# Patient Record
Sex: MALE | Race: WHITE | Employment: FULL TIME | URBAN - METROPOLITAN AREA
[De-identification: names, ages, dates, MRNs, and addresses within clinical notes are randomized per-mention and may not be internally consistent; named-entity substitution may affect disease eponyms.]

---

## 2017-03-28 ENCOUNTER — OFFICE VISIT (OUTPATIENT)
Dept: FAMILY MEDICINE | Facility: CLINIC | Age: 27
End: 2017-03-28
Payer: COMMERCIAL

## 2017-03-28 VITALS
HEART RATE: 92 BPM | BODY MASS INDEX: 20.02 KG/M2 | DIASTOLIC BLOOD PRESSURE: 70 MMHG | SYSTOLIC BLOOD PRESSURE: 110 MMHG | TEMPERATURE: 98.3 F | OXYGEN SATURATION: 97 % | WEIGHT: 113 LBS | HEIGHT: 63 IN

## 2017-03-28 DIAGNOSIS — L30.9 DERMATITIS: ICD-10-CM

## 2017-03-28 DIAGNOSIS — L85.8 KERATOSIS PILARIS: Primary | ICD-10-CM

## 2017-03-28 DIAGNOSIS — L70.0 ACNE VULGARIS: ICD-10-CM

## 2017-03-28 PROCEDURE — 99213 OFFICE O/P EST LOW 20 MIN: CPT | Performed by: FAMILY MEDICINE

## 2017-03-28 RX ORDER — CLINDAMYCIN PHOSPHATE 10 MG/G
GEL TOPICAL 2 TIMES DAILY
Qty: 60 G | Refills: 11 | Status: SHIPPED | OUTPATIENT
Start: 2017-03-28 | End: 2018-05-07

## 2017-03-28 RX ORDER — TRIAMCINOLONE ACETONIDE 1 MG/G
CREAM TOPICAL
Qty: 30 G | Refills: 0 | Status: SHIPPED | OUTPATIENT
Start: 2017-03-28 | End: 2018-05-07

## 2017-03-28 NOTE — NURSING NOTE
"Chief Complaint   Patient presents with     Derm Problem       Initial /70  Pulse 92  Temp 98.3  F (36.8  C) (Oral)  Ht 5' 3\" (1.6 m)  Wt 113 lb (51.3 kg)  SpO2 97%  BMI 20.02 kg/m2 Estimated body mass index is 20.02 kg/(m^2) as calculated from the following:    Height as of this encounter: 5' 3\" (1.6 m).    Weight as of this encounter: 113 lb (51.3 kg).  Medication Reconciliation: complete  "

## 2017-03-28 NOTE — MR AVS SNAPSHOT
After Visit Summary   3/28/2017    Travis Fraga    MRN: [id number]           Patient Information     Date Of Birth          1990        Visit Information        Provider Department      3/28/2017 4:20 PM Howard Bonner MD [facility] [place]        Today's Diagnoses     Keratosis pilaris    -  1    Dermatitis        Acne vulgaris          Care Instructions                   Acne                  What is acne?   Acne is a skin condition that occurs when the oil-secreting glands in the skin are clogged and become inflamed or infected.   How does it occur?   Acne is caused by inflammation or infection of the oil glands in the skin and at the base of hairs. In the teenage years, hormones stimulate the growth of body hair, and the oil glands secrete more oil. The skin pores (where the hairs grow out) become clogged and bacteria grow in the clogged pores. When the body works to kill the bacteria, then whiteheads, blackheads, and pustules form in these areas. Ninety percent of teenagers get acne.   Stress and too little rest can make acne worse.   What are the symptoms?   The symptoms of acne are:   whiteheads, which are closed plugged oil glands   blackheads, which are open plugged oil glands (the oil turns black when it's exposed to the air)   pustules, which are red, inflamed, infected plugged oil glands, sometimes filled with pus.   Some pustules may be painful. In severe cases, cysts or nodules (large fluid-filled bumps) may develop under the skin.   How is it diagnosed?   Your healthcare provider will check your skin to see what type of problem you are having (such as blackheads or cysts). Your provider will look to see where you are having problems, for example, your face or back. Your provider will want to know how long you have had the problem, how you have been caring for your skin, and what treatments you have tried that haven't worked.   How is it treated?   Treatment is aimed at  keeping oil and dirt out of the pores and reducing inflammation.   You and your healthcare provider will talk about how you are currently taking care of your skin. You will discuss which products, such as soaps and lotions, you should or should not use. If you have been using prescription medications for your acne, bring the medicine names or containers to your appointment.   Several products may be used to help prevent pimples or blackheads. Treatment usually begins with putting products containing benzoyl peroxide on the areas of skin with acne.   If benzoyl peroxide alone is not effective, then you may also need to put an antibiotic medicine on your skin, or your healthcare provider may prescribe an antibiotic to be taken by mouth. You may also need to use a skin cream or gel containing tretinoin (Retin-A).   Birth control pills are another treatment that might be prescribed for women. The pills can be used to change the hormone levels and decrease acne. All of the same precautions apply as when women are using the pills for birth control.   An oral medicine called isotretinoin is available for severe acne. However, women must use isotretinoin very carefully. It can cause severe birth defects if a woman becomes pregnant while she is taking the drug or even if she has taken it 1 or 2 months before becoming pregnant.   If you have large cysts, your healthcare provider may inject them with medicine to try to prevent scarring.   How long will the effects last?   New whiteheads usually stop appearing after 4 to 6 weeks of treatment, but you will probably need to continue the treatment for several months. If you are taking an antibiotic, at some point your healthcare provider will ask you to stop taking it to see if it is still needed. Sometimes acne treatment must be continued for several years.   Many factors may worsen acne temporarily. For example, women may notice that their acne gets worse before each menstrual  period. So even with proper treatment, results may vary over time. Try to discover and change, when possible, the factors in your environment or lifestyle that make the acne worse.   How can I take care of myself?   Follow the full treatment prescribed by your healthcare provider. In addition you can:   Wash your face 1 to 2 times a day with a gentle soap. Change your washcloth every day (bacteria can grow on damp cloth). Wash as soon as possible after you exercise.   Wash your hands often and keep your hands away from your face as much as possible. Don't squeeze, pick, scratch, or rub your skin. If you squeeze pimples, you may spread infection and scars may form. Don't rest your face on your hands while you read, study, or watch TV.   Shampoo your hair at least twice a week. Pull your hair away from your face when you sleep. Style it away from your face during the day.   Although researchers have not been able to show that any foods cause acne, some people have found that certain foods seem to worsen their acne. Keep a record of the foods you eat and try to see if any foods appear to make your acne worse. Try avoiding those foods.   Avoid working in hot duran where greasy foods are cooked.   Avoid extreme stress if possible. Practice stress reduction strategies such as exercise, meditation, and counseling if you have a lot of stress.   Get physical exercise regularly.   Keep your follow-up appointments with your healthcare provider. Keep a record of the treatments you have tried and how they have worked. Let your provider know if your medicine isn't working. There are many alternatives for you and your provider to try, so don't give up!         Published by [employer].  This content is reviewed periodically and is subject to change as new health information becomes available. The information is intended to inform and educate and is not a replacement for medical evaluation, advice, diagnosis or treatment by a  "healthcare professional.   Developed by Sindi Soliman RN, [place], and [employer].   ? 2010 [employer] [employer] and/or its affiliates. All Rights Reserved.   Copyright   Clinical Reference Systems 2011              Follow-ups after your visit        Follow-up notes from your care team     Return if symptoms worsen or fail to improve.      Who to contact     If you have questions or need follow up information about today's clinic visit or your schedule please contact [facility] directly at [phone].  Normal or non-critical lab and imaging results will be communicated to you by [employer], letter or phone within 4 business days after the clinic has received the results. If you do not hear from us within 7 days, please contact the clinic through [employer] or phone. If you have a critical or abnormal lab result, we will notify you by phone as soon as possible.  Submit refill requests through [employer] or call your pharmacy and they will forward the refill request to us. Please allow 3 business days for your refill to be completed.          Additional Information About Your Visit        [employer] Information     [employer] gives you secure access to your electronic health record. If you see a primary care provider, you can also send messages to your care team and make appointments. If you have questions, please call your primary care clinic.  If you do not have a primary care provider, please call [phone] and they will assist you.        Care EveryWhere ID     This is your Care EveryWhere ID. This could be used by other organizations to access your [facility] medical records  [id number]        Your Vitals Were     Pulse Temperature Height Pulse Oximetry BMI (Body Mass Index)       92 98.3  F (36.8  C) (Oral) 5' 3\" (1.6 m) 97% 20.02 kg/m2        Blood Pressure from Last 3 Encounters:   03/28/17 110/70   11/22/16 94/58   11/07/16 92/50    Weight from Last 3 Encounters:   03/28/17 113 lb (51.3 kg)   11/22/16 114 lb " (51.7 kg)   11/07/16 110 lb (49.9 kg)              Today, you had the following     No orders found for display         Today's Medication Changes          These changes are accurate as of: 3/28/17  5:05 PM.  If you have any questions, ask your nurse or doctor.               Start taking these medicines.        Dose/Directions    clindamycin 1 % topical gel   Commonly known as:  CLINDAMAX   Used for:  Acne vulgaris   Started by:  Howard Bonner MD        Apply topically 2 times daily   Quantity:  60 g   Refills:  11       triamcinolone 0.1 % cream   Commonly known as:  KENALOG   Used for:  Dermatitis   Started by:  Howard Bonner MD        Apply sparingly to affected area three times daily for 14 days.   Quantity:  30 g   Refills:  0            Where to get your medicines      These medications were sent to [facility] [place] [address]     Phone:  [phone]     clindamycin 1 % topical gel    triamcinolone 0.1 % cream                Primary Care Provider Office Phone # Fax #    Howard Bonner [phone]       [address] [address]        Thank you!     Thank you for choosing [facility]  for your care. Our goal is always to provide you with excellent care. Hearing back from our patients is one way we can continue to improve our services. Please take a few minutes to complete the written survey that you may receive in the mail after your visit with us. Thank you!             Your Updated Medication List - Protect others around you: Learn how to safely use, store and throw away your medicines at www.disposemymeds.org.          This list is accurate as of: 3/28/17  5:05 PM.  Always use your most recent med list.                   Brand Name Dispense Instructions for use    acetaminophen 325 MG tablet    TYLENOL    100 tablet    Take 2  tablets (650 mg) by mouth every 6 hours as needed for mild pain       albuterol 108 (90 BASE) MCG/ACT Inhaler    PROAIR HFA/PROVENTIL HFA/VENTOLIN HFA    1 Inhaler    Inhale 2 puffs into the lungs every 6 hours as needed for shortness of breath / dyspnea or wheezing       clindamycin 1 % topical gel    CLINDAMAX    60 g    Apply topically 2 times daily       triamcinolone 0.1 % cream    KENALOG    30 g    Apply sparingly to affected area three times daily for 14 days.

## 2017-03-28 NOTE — PROGRESS NOTES
"  SUBJECTIVE:                                                    Travis Fraga is a 26 year old male who presents to clinic today for the following health issues:    \"Ingrown hair\"  Patient developed small red firm papuels on the anterior thigh hat he is concerned are ingrown hairs bilaterally since October 2016. He has been using selsun blue but not resolving. It becomes itchy at times, but is not painful.     Acne  He mentions he has acne breakouts on lower right & left side of his face and on his back.     Tobacco abuse  He smokes 0.5 ppd. He experiences a cough after exertion.     Problem list and histories reviewed & adjusted, as indicated.  Additional history: as documented    ROS:  Constitutional, HEENT, cardiovascular, pulmonary, GI, , musculoskeletal, neuro, skin, endocrine and psych systems are negative, except as otherwise noted.    This document serves as a record of the services and decisions personally performed and made by Howard Bonner MD. It was created on his behalf by Krystyna Cintron, a trained medical scribe. The creation of this document is based on the provider's statements to the medical scribe.  Krystyna Cintron 5:11 PM March 28, 2017    OBJECTIVE:                                                    /70  Pulse 92  Temp 98.3  F (36.8  C) (Oral)  Ht 1.6 m (5' 3\")  Wt 51.3 kg (113 lb)  SpO2 97%  BMI 20.02 kg/m2 Body mass index is 20.02 kg/(m^2).   GENERAL: healthy, alert, well nourished, well hydrated, no distress  SKIN: few small pink papules on the thighs and a few comedones on the lower faceno suspicious lesions, no rashes  Diagnostic test results:  none      ASSESSMENT/PLAN:         Travis was seen today for derm problem.    Diagnoses and all orders for this visit:    Keratosis pilaris/Dermatitis  Steroid cream and lotion for itchiness.   -     triamcinolone (KENALOG) 0.1 % cream; Apply sparingly to affected area three times daily for 14 days.    Acne vulgaris  Patient can " use OTC benzoyl peroxide for acne if needed in the future.  -     clindamycin (CLINDAMAX) 1 % topical gel; Apply topically 2 times daily    Risks, benefits and alternatives of treatments discussed. Plan agreed on.      Followup: As needed    Will call, return to clinic, or go to ED if worsening or symptoms not improving as discussed.    See patient instructions.       Health Maintenance Topics with due status: Overdue       Topic Date Due    HPV IMMUNIZATION 06/19/2003       Health maintenance reviewed/updated? Yes    The information in this document, created by the medical scribe for me, accurately reflects the services I personally performed and the decisions made by me. I have reviewed and approved this document for accuracy prior to leaving the patient care area.  March 28, 2017 5:11 PM        Isrrael Bonner MD

## 2017-03-28 NOTE — PATIENT INSTRUCTIONS
Acne                  What is acne?   Acne is a skin condition that occurs when the oil-secreting glands in the skin are clogged and become inflamed or infected.   How does it occur?   Acne is caused by inflammation or infection of the oil glands in the skin and at the base of hairs. In the teenage years, hormones stimulate the growth of body hair, and the oil glands secrete more oil. The skin pores (where the hairs grow out) become clogged and bacteria grow in the clogged pores. When the body works to kill the bacteria, then whiteheads, blackheads, and pustules form in these areas. Ninety percent of teenagers get acne.   Stress and too little rest can make acne worse.   What are the symptoms?   The symptoms of acne are:   whiteheads, which are closed plugged oil glands   blackheads, which are open plugged oil glands (the oil turns black when it's exposed to the air)   pustules, which are red, inflamed, infected plugged oil glands, sometimes filled with pus.   Some pustules may be painful. In severe cases, cysts or nodules (large fluid-filled bumps) may develop under the skin.   How is it diagnosed?   Your healthcare provider will check your skin to see what type of problem you are having (such as blackheads or cysts). Your provider will look to see where you are having problems, for example, your face or back. Your provider will want to know how long you have had the problem, how you have been caring for your skin, and what treatments you have tried that haven't worked.   How is it treated?   Treatment is aimed at keeping oil and dirt out of the pores and reducing inflammation.   You and your healthcare provider will talk about how you are currently taking care of your skin. You will discuss which products, such as soaps and lotions, you should or should not use. If you have been using prescription medications for your acne, bring the medicine names or containers to your appointment.   Several  products may be used to help prevent pimples or blackheads. Treatment usually begins with putting products containing benzoyl peroxide on the areas of skin with acne.   If benzoyl peroxide alone is not effective, then you may also need to put an antibiotic medicine on your skin, or your healthcare provider may prescribe an antibiotic to be taken by mouth. You may also need to use a skin cream or gel containing tretinoin (Retin-A).   Birth control pills are another treatment that might be prescribed for women. The pills can be used to change the hormone levels and decrease acne. All of the same precautions apply as when women are using the pills for birth control.   An oral medicine called isotretinoin is available for severe acne. However, women must use isotretinoin very carefully. It can cause severe birth defects if a woman becomes pregnant while she is taking the drug or even if she has taken it 1 or 2 months before becoming pregnant.   If you have large cysts, your healthcare provider may inject them with medicine to try to prevent scarring.   How long will the effects last?   New whiteheads usually stop appearing after 4 to 6 weeks of treatment, but you will probably need to continue the treatment for several months. If you are taking an antibiotic, at some point your healthcare provider will ask you to stop taking it to see if it is still needed. Sometimes acne treatment must be continued for several years.   Many factors may worsen acne temporarily. For example, women may notice that their acne gets worse before each menstrual period. So even with proper treatment, results may vary over time. Try to discover and change, when possible, the factors in your environment or lifestyle that make the acne worse.   How can I take care of myself?   Follow the full treatment prescribed by your healthcare provider. In addition you can:   Wash your face 1 to 2 times a day with a gentle soap. Change your washcloth every  day (bacteria can grow on damp cloth). Wash as soon as possible after you exercise.   Wash your hands often and keep your hands away from your face as much as possible. Don't squeeze, pick, scratch, or rub your skin. If you squeeze pimples, you may spread infection and scars may form. Don't rest your face on your hands while you read, study, or watch TV.   Shampoo your hair at least twice a week. Pull your hair away from your face when you sleep. Style it away from your face during the day.   Although researchers have not been able to show that any foods cause acne, some people have found that certain foods seem to worsen their acne. Keep a record of the foods you eat and try to see if any foods appear to make your acne worse. Try avoiding those foods.   Avoid working in hot duran where greasy foods are cooked.   Avoid extreme stress if possible. Practice stress reduction strategies such as exercise, meditation, and counseling if you have a lot of stress.   Get physical exercise regularly.   Keep your follow-up appointments with your healthcare provider. Keep a record of the treatments you have tried and how they have worked. Let your provider know if your medicine isn't working. There are many alternatives for you and your provider to try, so don't give up!         Published by [employer].  This content is reviewed periodically and is subject to change as new health information becomes available. The information is intended to inform and educate and is not a replacement for medical evaluation, advice, diagnosis or treatment by a healthcare professional.   Developed by Sindi Soliman RN, [place], and [employer].   ? 2010 [facility] and/or its affiliates. All Rights Reserved.   Copyright   Clinical Reference Systems 2011

## 2018-05-07 ENCOUNTER — OFFICE VISIT (OUTPATIENT)
Dept: FAMILY MEDICINE | Facility: CLINIC | Age: 28
End: 2018-05-07
Payer: COMMERCIAL

## 2018-05-07 VITALS
WEIGHT: 123 LBS | DIASTOLIC BLOOD PRESSURE: 76 MMHG | HEIGHT: 63 IN | HEART RATE: 87 BPM | BODY MASS INDEX: 21.79 KG/M2 | TEMPERATURE: 98.3 F | SYSTOLIC BLOOD PRESSURE: 124 MMHG | OXYGEN SATURATION: 97 %

## 2018-05-07 DIAGNOSIS — J45.20 MILD INTERMITTENT ASTHMA WITHOUT COMPLICATION: Primary | ICD-10-CM

## 2018-05-07 DIAGNOSIS — F17.200 TOBACCO USE DISORDER: ICD-10-CM

## 2018-05-07 DIAGNOSIS — J06.9 UPPER RESPIRATORY TRACT INFECTION, UNSPECIFIED TYPE: ICD-10-CM

## 2018-05-07 PROCEDURE — 99214 OFFICE O/P EST MOD 30 MIN: CPT | Performed by: PHYSICIAN ASSISTANT

## 2018-05-07 RX ORDER — ALBUTEROL SULFATE 90 UG/1
2 AEROSOL, METERED RESPIRATORY (INHALATION) EVERY 4 HOURS PRN
Qty: 1 INHALER | Refills: 11 | Status: SHIPPED | OUTPATIENT
Start: 2018-05-07 | End: 2019-03-29

## 2018-05-07 RX ORDER — AZITHROMYCIN 250 MG/1
TABLET, FILM COATED ORAL
Qty: 6 TABLET | Refills: 0 | Status: SHIPPED | OUTPATIENT
Start: 2018-05-07 | End: 2018-11-12

## 2018-05-07 NOTE — MR AVS SNAPSHOT
After Visit Summary   5/7/2018    Travis Fraga    MRN: [id number]           Patient Information     Date Of Birth          1990        Visit Information        Provider Department      5/7/2018 11:40 AM Angelina Guillen PA-C [facility]        Today's Diagnoses     Mild intermittent asthma without complication - with illnesses    -  1    Tobacco use disorder        Upper respiratory tract infection, unspecified type          Care Instructions    Please take the albuterol every 4-6 hours as needed for shortness of breath, wheezing or cough.   Also, the Azithromycin as prescribed for the respiratory tract infection.     Please followup in the next month for your routine physical, or be seen sooner if needed.   Please seek immediate medical attention if symptoms change or worsen in any way.            Follow-ups after your visit        Follow-up notes from your care team     Return in about 1 month (around 6/7/2018) for Physical Exam, Routine Visit, please be seen sooner if needed.      Who to contact     If you have questions or need follow up information about today's clinic visit or your schedule please contact [facility] directly at [phone].  Normal or non-critical lab and imaging results will be communicated to you by [employer], letter or phone within 4 business days after the clinic has received the results. If you do not hear from us within 7 days, please contact the clinic through [employer] or phone. If you have a critical or abnormal lab result, we will notify you by phone as soon as possible.  Submit refill requests through Access Information Management or call your pharmacy and they will forward the refill request to us. Please allow 3 business days for your refill to be completed.          Additional Information About Your Visit        [employer] Information     Access Information Management gives you secure access to your electronic health record. If you see a primary care provider, you can also  "send messages to your care team and make appointments. If you have questions, please call your primary care clinic.  If you do not have a primary care provider, please call [phone] and they will assist you.        Care EveryWhere ID     This is your Care EveryWhere ID. This could be used by other organizations to access your [place] medical records  [id number]        Your Vitals Were     Pulse Temperature Height Pulse Oximetry BMI (Body Mass Index)       87 98.3  F (36.8  C) (Oral) 5' 3\" (1.6 m) 97% 21.79 kg/m2        Blood Pressure from Last 3 Encounters:   05/07/18 124/76   03/28/17 110/70   11/22/16 94/58    Weight from Last 3 Encounters:   05/07/18 123 lb (55.8 kg)   03/28/17 113 lb (51.3 kg)   11/22/16 114 lb (51.7 kg)              Today, you had the following     No orders found for display         Today's Medication Changes          These changes are accurate as of 5/7/18 12:13 PM.  If you have any questions, ask your nurse or doctor.               Start taking these medicines.        Dose/Directions    azithromycin 250 MG tablet   Commonly known as:  ZITHROMAX   Used for:  Upper respiratory tract infection, unspecified type   Started by:  Angelina Guillen PA-C        Two tablets first day, then one tablet daily for four days.   Quantity:  6 tablet   Refills:  0         These medicines have changed or have updated prescriptions.        Dose/Directions    albuterol 108 (90 Base) MCG/ACT Inhaler   Commonly known as:  PROAIR HFA/PROVENTIL HFA/VENTOLIN HFA   This may have changed:  when to take this   Used for:  Mild intermittent asthma without complication   Changed by:  Angelina Guillen PA-C        Dose:  2 puff   Inhale 2 puffs into the lungs every 4 hours as needed for shortness of breath / dyspnea or wheezing   Quantity:  1 Inhaler   Refills:  11            Where to get your medicines      These medications were sent to [email or web address] [address] [place] [address] AT [facility] OF " [facility] [address] [address]    Hours:  24-hours Phone:  [phone]     albuterol 108 (90 Base) MCG/ACT Inhaler    azithromycin 250 MG tablet                Primary Care Provider Office Phone # Fax #    Howard Bonner [phone]       [address] [place] [address]        Equal Access to Services     LUIS ENRIQUE HATFIELD : Hadii aad ku hadasho Soomaali, waaxda luqadaha, qaybta kaalmada adeegyada, waxay idiin hayaan adeeg constantincrystalvanessa lasophie . So [facility] [phone].    ATENCIÓN: Si habla español, tiene a lopez disposición servicios gratuitos de asistencia lingüística. Garryame al [phone].    We comply with applicable federal civil rights laws and [place] laws. We do not discriminate on the basis of race, color, national origin, age, disability, sex, sexual orientation, or gender identity.            Thank you!     Thank you for choosing [facility]  for your care. Our goal is always to provide you with excellent care. Hearing back from our patients is one way we can continue to improve our services. Please take a few minutes to complete the written survey that you may receive in the mail after your visit with us. Thank you!             Your Updated Medication List - Protect others around you: Learn how to safely use, store and throw away your medicines at www.disposemymeds.org.          This list is accurate as of 5/7/18 12:13 PM.  Always use your most recent med list.                   Brand Name Dispense Instructions for use Diagnosis    albuterol 108 (90 Base) MCG/ACT Inhaler    PROAIR HFA/PROVENTIL HFA/VENTOLIN HFA    1 Inhaler    Inhale 2 puffs into the lungs every 4 hours as needed for shortness of breath / dyspnea or wheezing    Mild intermittent asthma without complication       azithromycin 250 MG tablet    ZITHROMAX    6 tablet    Two tablets first day, then one tablet daily for four days.    Upper respiratory tract infection,  unspecified type

## 2018-05-07 NOTE — PROGRESS NOTES
SUBJECTIVE:                                                    Travis Fraga is a 27 year old male who presents to clinic today for the following health issues:      Acute Illness   Acute illness concerns: Sinus problem, cough  Onset: x1 weeks    Fever: YES- felt warm    Chills/Sweats: YES- both    Headache (location?): YES- forehead    Sinus Pressure:YES    Conjunctivitis:  YES- right blood shot after coughing hard    Ear Pain: no    Rhinorrhea: YES yellow green    Congestion: YES- chest sinus    Sore Throat: YES- from coughing - all the time     Cough: YES-productive of yellow sputum, productive of green sputum    Wheeze: YES- last couple days    Decreased Appetite: YES- sporadic     Nausea: no    Vomiting: YES- this morning from coughing    Diarrhea:  no    Dysuria/Freq.: no    Fatigue/Achiness: YES- both - on and off last 7 days    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    He is coughing up production, that is clear-yellow/green in color.  He does have sinus pain under his eyes and around his nose.  He is blowing yellow/green congestion out of his nose as well.  As the day goes on the congestion gets more clear.  Patient does not have a thermometer, but he has had temperature changes of feeling really hot and then very very cold.      He has not used the albuterol at all with this illness as he does not have it.  He does feel a little wheezing with the illness.      Problem list and histories reviewed & adjusted, as indicated.  Additional history: as documented      ROS:  Constitutional, HEENT, cardiovascular, pulmonary, GI, , musculoskeletal, neuro, skin, endocrine and psych systems are negative, except as otherwise noted.    OBJECTIVE:                                                    /76 (BP Location:  "Right arm, Patient Position: Chair, Cuff Size: Adult Regular)  Pulse 87  Temp 98.3  F (36.8  C) (Oral)  Ht 5' 3\" (1.6 m)  Wt 123 lb (55.8 kg)  SpO2 97%  BMI 21.79 kg/m2  Body mass index is 21.79 kg/(m^2).  GENERAL: healthy, alert and no distress  EYES: Eyes grossly normal to inspection, PERRL and conjunctivae and sclerae normal  HENT: ear canals and TM's normal, nose and mouth without ulcers or lesions  NECK: no adenopathy, no asymmetry, masses, or scars and thyroid normal to palpation  RESP: lungs clear to auscultation - no rales, rhonchi or wheezes, mildly prolonged expiratory phase.   CV: regular rate and rhythm, normal S1 S2, no S3 or S4, no murmur, click or rub, no peripheral edema and peripheral pulses strong  MS: no gross musculoskeletal defects noted, no edema  NEURO: Normal strength and tone, mentation intact and speech normal  PSYCH: mentation appears normal, affect normal/bright    Diagnostic Test Results:  none      ASSESSMENT/PLAN:                                                      Travis was seen today for cough and sinus problem.    Diagnoses and all orders for this visit:    Mild intermittent asthma without complication - with illnesses  -     albuterol (PROAIR HFA/PROVENTIL HFA/VENTOLIN HFA) 108 (90 Base) MCG/ACT Inhaler; Inhale 2 puffs into the lungs every 4 hours as needed for shortness of breath / dyspnea or wheezing    Upper respiratory tract infection, unspecified type  -     azithromycin (ZITHROMAX) 250 MG tablet; Two tablets first day, then one tablet daily for four days.    Tobacco use disorder      - Patient treated for URI symptoms due to length of illness and worsening of symptoms.  Patient has a smoking history and a history of asthma.  Smoking cessation strongly encouraged today.   - Refill of the albuterol given today as patient has been without this.  He has been encouraged to use the inhaler every 4-6 hours as needed for cough, shortness of breath or wheezing.    - Patient to " seek more immediate medical attention if symptoms change or worsen in any way.      - Note provided to patient today as he is out of work today for the illness.      -- I have discussed the patient's diagnosis, and my plan of treatment with the patient and/or family. Patient is aware to followup if symptoms do not improve.  Patient has been advised to be seen sooner or seek more immediate care if symptoms change or worsen.  Patient agrees with and understands the plan today.     See Patient Instructions:  Please take the albuterol every 4-6 hours as needed for shortness of breath, wheezing or cough.   Also, the Azithromycin as prescribed for the respiratory tract infection.     Please followup in the next month for your routine physical, or be seen sooner if needed.   Please seek immediate medical attention if symptoms change or worsen in any way.          Angelina Guillen PA-C    [facility] [place]

## 2018-05-07 NOTE — LETTER
[facility] - [address] [address]                                                                                                       [phone]    May 7, 2018    Travis [address] [place]  [address] [facility] [address]  [facility] [address]      To Whom it May Concern:    The above patient is unable to attend work for 05.07.2018 due to a medical issue. Please contact me with questions or concerns.      Sincerely,      Angelina Guillen PA-C

## 2018-05-07 NOTE — PATIENT INSTRUCTIONS
Please take the albuterol every 4-6 hours as needed for shortness of breath, wheezing or cough.   Also, the Azithromycin as prescribed for the respiratory tract infection.     Please followup in the next month for your routine physical, or be seen sooner if needed.   Please seek immediate medical attention if symptoms change or worsen in any way.

## 2018-11-12 ENCOUNTER — OFFICE VISIT (OUTPATIENT)
Dept: FAMILY MEDICINE | Facility: CLINIC | Age: 28
End: 2018-11-12

## 2018-11-12 VITALS
OXYGEN SATURATION: 100 % | BODY MASS INDEX: 21.09 KG/M2 | WEIGHT: 119 LBS | DIASTOLIC BLOOD PRESSURE: 64 MMHG | TEMPERATURE: 98 F | HEIGHT: 63 IN | HEART RATE: 92 BPM | SYSTOLIC BLOOD PRESSURE: 118 MMHG

## 2018-11-12 DIAGNOSIS — H01.002 BLEPHARITIS OF RIGHT LOWER EYELID, UNSPECIFIED TYPE: Primary | ICD-10-CM

## 2018-11-12 PROCEDURE — 99213 OFFICE O/P EST LOW 20 MIN: CPT | Performed by: PHYSICIAN ASSISTANT

## 2018-11-12 NOTE — PROGRESS NOTES
"  SUBJECTIVE:                                                    Travis Fraga is a 28 year old male who presents to clinic today for the following health issues:      Eye(s) Problem / Feels he has Styes right eye    Onset: July 2018    Description:   Location: bilateral  Pain: YES  Redness: YES    Accompanying Signs & Symptoms:  Discharge/mattering: no  Swelling: no  Visual changes: no  Fever: no  Nasal Congestion: no  Bothered by bright lights: no    History:   Trauma: YES- Hot  Crack in eyes in July  Foreign body exposure: YES- See Above    Precipitating factors:   Wearing contacts: no    Alleviating factors:  Improved by:       Therapies Tried and outcome: none    Patient reports that 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  He says that a friend told him that he would know if he got any of it in his eyes.  He says that he monitored symptoms over the next couple of week and never went in because he didn't have any concerns.  He says that about one month later he started to feel itchiness and dryness in the right eye.  He noticed a pimple on his right eye just last week.  He also reports to having three small bumps on the lower eyelid.  He does not notice any discharge or mattering of the eyes, but the eyes do water.  He says that the itchiness comes and goes throughout the day.  He does feel like there is something in the eye.  He denies any visual changes and has not had any light sensitivity.        Eyesight today is:  20/25 (Left) and 20/20 (right)    Problem list and histories reviewed & adjusted, as indicated.  Additional history: as documented      ROS:  Constitutional, HEENT, cardiovascular, pulmonary, GI, , musculoskeletal, neuro, skin, endocrine and psych systems are negative, except as " "otherwise noted.    OBJECTIVE:                                                    /64  Pulse 92  Temp 98  F (36.7  C) (Tympanic)  Ht 5' 3\" (1.6 m)  Wt 119 lb (54 kg)  SpO2 100%  BMI 21.08 kg/m2  Body mass index is 21.08 kg/(m^2).  GENERAL: healthy, alert and no distress  EYES: Eyes grossly normal to inspection, PERRL and conjunctivae and sclerae normal, right bottom lid with three small skin colored papules.    MS: no gross musculoskeletal defects noted, no edema  SKIN: no suspicious lesions or rashes  NEURO: Normal strength and tone, mentation intact and speech normal  PSYCH: mentation appears normal, affect normal/bright    Diagnostic Test Results:  none      ASSESSMENT/PLAN:                                                      Travis was seen today for eye problem.    Diagnoses and all orders for this visit:    Blepharitis of right lower eyelid, unspecified type  -     OPHTHALMOLOGY ADULT REFERRAL      No stye or chalazion appearing lesions on the eyes.  Patient reports that the \"pimple\" is gone.  There are small, skin colored papules on the bottom lid of the right eye.  Patient has been advised of cares for blepharitis, and has been advised to followup with ophthalmology if symptoms persist or worsen in any way.  Visual acuity is within normal limits today.      Followup: Return in about 1 week (around 11/19/2018) for Specialty followup, please be seen sooner if needed.    -- I have discussed the patient's diagnosis, and my plan of treatment with the patient and/or family. Patient is aware to followup if symptoms do not improve.  Patient has been advised to be seen sooner or seek more immediate care if symptoms change or worsen.  Patient agrees with and understands the plan today.     See Patient Instructions        Angelina Guillen PA-C    [facility] [place]    "

## 2018-11-12 NOTE — MR AVS SNAPSHOT
After Visit Summary   11/12/2018    Travis Fraga    MRN: [id number]           Patient Information     Date Of Birth          1990        Visit Information        Provider Department      11/12/2018 4:20 PM Angelina Guillen PA-C [facility] [place]        Today's Diagnoses     Blepharitis of right lower eyelid, unspecified type    -  1      Care Instructions      Treating Blepharitis: Self-Care    To treat the problem, keep your eyelids clean. Warm compresses can reduce redness and swelling, and help clean your eyelids, too. You may also need to wash the area gently with an eyelid scrub when you wake up.  To apply a warm compress:  1. Wash your hands with soap and warm water.  2. Wet a clean washcloth with warm water. Then wring it out.  3. Close your eyes and place the washcloth over your eyelids for 3 to 5 minutes. This helps loosen scales or crusts.  4. Wet the washcloth again as often as needed to keep it warm.  Repeat 2 or more times a day. Use a clean washcloth each time.     To use an eyelid scrub:  1. Wash your hands with soap and warm water.  2. Use a ready-made eyelid scrub. Or mix 3 drops of baby shampoo in 1/4 cup of warm water.  3. Dip a lint-free pad, cotton swab, or clean washcloth in the scrub.  4. Close one eye and gently scrub the base of the eyelid.  5. Rinse the lid in cool water and dry with a clean towel.  6. Repeat on your other eye.  Date Last Reviewed: 3/1/2018    2401-6898 [employer]. [address] [place] [address]. All rights reserved. This information is not intended as a substitute for professional medical care. Always follow your healthcare professional's instructions.                Follow-ups after your visit        Additional Services     OPHTHALMOLOGY ADULT REFERRAL       Your provider has referred you to:   [facility] -   [place] [phone]   [email or web address]  [facility] [phone]    [email or web address]  [place] [phone]   [email or web address]    Please be aware that coverage of these services is subject to the terms and limitations of your health insurance plan.  Call member services at your health plan with any benefit or coverage questions.      Please bring the following with you to your appointment:    (1) Any X-Rays, CTs or MRIs which have been performed.  Contact the facility where they were done to arrange for  prior to your scheduled appointment.  Any new CT, MRI or other procedures ordered by your specialist must be performed at a [place] facility or coordinated by your clinic's referral office.  (2) List of current medications  (3) This referral request   (4) Any documents/labs given to you for this referral                  Follow-up notes from your care team     Return in about 1 week (around 11/19/2018) for Specialty followup, please be seen sooner if needed.      Who to contact     If you have questions or need follow up information about today's clinic visit or your schedule please contact [facility] [place] [place] directly at [phone].  Normal or non-critical lab and imaging results will be communicated to you by MyChart, letter or phone within 4 business days after the clinic has received the results. If you do not hear from us within 7 days, please contact the clinic through [employer] or phone. If you have a critical or abnormal lab result, we will notify you by phone as soon as possible.  Submit refill requests through eyefactive or call your pharmacy and they will forward the refill request to us. Please allow 3 business days for your refill to be completed.          Additional Information About Your Visit        eyefactive Information     eyefactive gives you secure access to your electronic health record. If you see a primary care provider, you can also send messages to your care team and make appointments. If you have questions, please call your primary  "care clinic.  If you do not have a primary care provider, please call [phone] and they will assist you.        Care EveryWhere ID     This is your Care EveryWhere ID. This could be used by other organizations to access your [place] medical records  [id number]        Your Vitals Were     Pulse Temperature Height Pulse Oximetry BMI (Body Mass Index)       92 98  F (36.7  C) (Tympanic) 5' 3\" (1.6 m) 100% 21.08 kg/m2        Blood Pressure from Last 3 Encounters:   11/12/18 118/64   05/07/18 124/76   03/28/17 110/70    Weight from Last 3 Encounters:   11/12/18 119 lb (54 kg)   05/07/18 123 lb (55.8 kg)   03/28/17 113 lb (51.3 kg)              We Performed the Following     OPHTHALMOLOGY ADULT REFERRAL        Primary Care Provider Office Phone # Fax #    Howard Bonner -[phone]       [address] [place] [address]        Equal Access to Services     [facility] : Hadii jeb ku hadasho Soomaali, waaxda luqadaha, qaybta kaalmada adeegyada, mayela yanez . So [facility] [phone].    ATENCIÓN: Si habla español, tiene a lopez disposición servicios gratuitos de asistencia lingüística. [facility] [phone].    We comply with applicable federal civil rights laws and [place] laws. We do not discriminate on the basis of race, color, national origin, age, disability, sex, sexual orientation, or gender identity.            Thank you!     Thank you for choosing [facility]  for your care. Our goal is always to provide you with excellent care. Hearing back from our patients is one way we can continue to improve our services. Please take a few minutes to complete the written survey that you may receive in the mail after your visit with us. Thank you!             Your Updated Medication List - Protect others around you: Learn how to safely use, store and throw away your medicines at www.disposemymeds.org.          This list is accurate as of 11/12/18  5:02 " PM.  Always use your most recent med list.                   Brand Name Dispense Instructions for use Diagnosis    albuterol 108 (90 Base) MCG/ACT inhaler    PROAIR HFA/PROVENTIL HFA/VENTOLIN HFA    1 Inhaler    Inhale 2 puffs into the lungs every 4 hours as needed for shortness of breath / dyspnea or wheezing    Mild intermittent asthma without complication

## 2018-11-12 NOTE — PATIENT INSTRUCTIONS
Treating Blepharitis: Self-Care    To treat the problem, keep your eyelids clean. Warm compresses can reduce redness and swelling, and help clean your eyelids, too. You may also need to wash the area gently with an eyelid scrub when you wake up.  To apply a warm compress:  1. Wash your hands with soap and warm water.  2. Wet a clean washcloth with warm water. Then wring it out.  3. Close your eyes and place the washcloth over your eyelids for 3 to 5 minutes. This helps loosen scales or crusts.  4. Wet the washcloth again as often as needed to keep it warm.  Repeat 2 or more times a day. Use a clean washcloth each time.     To use an eyelid scrub:  1. Wash your hands with soap and warm water.  2. Use a ready-made eyelid scrub. Or mix 3 drops of baby shampoo in 1/4 cup of warm water.  3. Dip a lint-free pad, cotton swab, or clean washcloth in the scrub.  4. Close one eye and gently scrub the base of the eyelid.  5. Rinse the lid in cool water and dry with a clean towel.  6. Repeat on your other eye.  Date Last Reviewed: 3/1/2018    8462-4755 [employer]. [address] [place] [address], [place] [address]. All rights reserved. This information is not intended as a substitute for professional medical care. Always follow your healthcare professional's instructions.

## 2018-11-13 ASSESSMENT — ASTHMA QUESTIONNAIRES: ACT_TOTALSCORE: 25

## 2018-12-04 ENCOUNTER — OFFICE VISIT (OUTPATIENT)
Dept: FAMILY MEDICINE | Facility: CLINIC | Age: 28
End: 2018-12-04

## 2018-12-04 VITALS
BODY MASS INDEX: 20.38 KG/M2 | TEMPERATURE: 98.6 F | DIASTOLIC BLOOD PRESSURE: 62 MMHG | OXYGEN SATURATION: 98 % | SYSTOLIC BLOOD PRESSURE: 136 MMHG | HEIGHT: 63 IN | WEIGHT: 115 LBS | HEART RATE: 96 BPM

## 2018-12-04 DIAGNOSIS — R19.7 DIARRHEA, UNSPECIFIED TYPE: Primary | ICD-10-CM

## 2018-12-04 DIAGNOSIS — R10.9 FLANK PAIN: ICD-10-CM

## 2018-12-04 LAB
ALBUMIN UR-MCNC: NEGATIVE MG/DL
APPEARANCE UR: CLEAR
BILIRUB UR QL STRIP: NEGATIVE
COLOR UR AUTO: YELLOW
ERYTHROCYTE [DISTWIDTH] IN BLOOD BY AUTOMATED COUNT: 12.6 % (ref 10–15)
GLUCOSE UR STRIP-MCNC: NEGATIVE MG/DL
HCT VFR BLD AUTO: 46.6 % (ref 40–53)
HGB BLD-MCNC: 16.3 G/DL (ref 13.3–17.7)
HGB UR QL STRIP: NEGATIVE
KETONES UR STRIP-MCNC: NEGATIVE MG/DL
LEUKOCYTE ESTERASE UR QL STRIP: NEGATIVE
MCH RBC QN AUTO: 30.8 PG (ref 26.5–33)
MCHC RBC AUTO-ENTMCNC: 35 G/DL (ref 31.5–36.5)
MCV RBC AUTO: 88 FL (ref 78–100)
NITRATE UR QL: NEGATIVE
PH UR STRIP: 7 PH (ref 5–7)
PLATELET # BLD AUTO: 252 10E9/L (ref 150–450)
RBC # BLD AUTO: 5.3 10E12/L (ref 4.4–5.9)
SOURCE: NORMAL
SP GR UR STRIP: 1.01 (ref 1–1.03)
UROBILINOGEN UR STRIP-ACNC: 0.2 EU/DL (ref 0.2–1)
WBC # BLD AUTO: 7.1 10E9/L (ref 4–11)

## 2018-12-04 PROCEDURE — 36415 COLL VENOUS BLD VENIPUNCTURE: CPT | Performed by: NURSE PRACTITIONER

## 2018-12-04 PROCEDURE — 80053 COMPREHEN METABOLIC PANEL: CPT | Performed by: NURSE PRACTITIONER

## 2018-12-04 PROCEDURE — 81003 URINALYSIS AUTO W/O SCOPE: CPT | Performed by: NURSE PRACTITIONER

## 2018-12-04 PROCEDURE — 99213 OFFICE O/P EST LOW 20 MIN: CPT | Performed by: NURSE PRACTITIONER

## 2018-12-04 PROCEDURE — 85027 COMPLETE CBC AUTOMATED: CPT | Performed by: NURSE PRACTITIONER

## 2018-12-04 NOTE — MR AVS SNAPSHOT
After Visit Summary   12/4/2018    Travis Fraga    MRN: [id number]           Patient Information     Date Of Birth          1990        Visit Information        Provider Department      12/4/2018 3:00 PM Sarah Byrnes APRN CNP [facility] [place]        Today's Diagnoses     Diarrhea, unspecified type    -  1    Flank pain          Care Instructions    Travis was seen today for diarrhea.    Diagnoses and all orders for this visit:    Diarrhea, unspecified type  -     Enteric Bacteria and Virus Panel by VIKI Stool; Future  -     Clostridium difficile Toxin B PCR; Future  -     Ova and Parasite Exam Routine; Future  -     CBC with platelets  -     Comprehensive metabolic panel    Flank pain  -     UA reflex to Microscopic and Culture    Will notify of results once available.             Follow-ups after your visit        Follow-up notes from your care team     Return in about 1 week (around 12/11/2018) for No improvement or worsening of symptoms.      Future tests that were ordered for you today     Open Future Orders        Priority Expected Expires Ordered    Enteric Bacteria and Virus Panel by VIKI Stool Routine  12/4/2019 12/4/2018    Clostridium difficile Toxin B PCR Routine  1/3/2019 12/4/2018    Ova and Parasite Exam Routine Routine  12/4/2019 12/4/2018            Who to contact     If you have questions or need follow up information about today's clinic visit or your schedule please contact [facility] [place] directly at [phone].  Normal or non-critical lab and imaging results will be communicated to you by MyChart, letter or phone within 4 business days after the clinic has received the results. If you do not hear from us within 7 days, please contact the clinic through MyChart or phone. If you have a critical or abnormal lab result, we will notify you by phone as soon as possible.  Submit refill requests through combionic or call your pharmacy and they will forward the  "refill request to us. Please allow 3 business days for your refill to be completed.          Additional Information About Your Visit        1Mindhart Information     [employer] gives you secure access to your electronic health record. If you see a primary care provider, you can also send messages to your care team and make appointments. If you have questions, please call your primary care clinic.  If you do not have a primary care provider, please call [phone] and they will assist you.        Care EveryWhere ID     This is your Care EveryWhere ID. This could be used by other organizations to access your [place] medical records  [id number]        Your Vitals Were     Pulse Temperature Height Pulse Oximetry BMI (Body Mass Index)       96 98.6  F (37  C) (Oral) 5' 3\" (1.6 m) 98% 20.37 kg/m2        Blood Pressure from Last 3 Encounters:   12/04/18 136/62   11/12/18 118/64   05/07/18 124/76    Weight from Last 3 Encounters:   12/04/18 115 lb (52.2 kg)   11/12/18 119 lb (54 kg)   05/07/18 123 lb (55.8 kg)              We Performed the Following     CBC with platelets     Comprehensive metabolic panel     UA reflex to Microscopic and Culture        Primary Care Provider Office Phone # Fax #    Howard Bonner [phone] [address] [facility] [address]        Equal Access to Services     [facility] : Hadii aad ku hadasho Soomaali, waaxda luqadaha, qaybta kaalmada adeegyada, mayela mccarthy. So [facility] [phone].    ATENCIÓN: Si habla español, tiene a lopez disposición servicios gratuitos de asistencia lingüística. Llame al [phone].    We comply with applicable federal civil rights laws and [place] laws. We do not discriminate on the basis of race, color, national origin, age, disability, sex, sexual orientation, or gender identity.            Thank you!     Thank you for choosing [facility] [place]  for your care. Our goal is always to provide you " with excellent care. Hearing back from our patients is one way we can continue to improve our services. Please take a few minutes to complete the written survey that you may receive in the mail after your visit with us. Thank you!             Your Updated Medication List - Protect others around you: Learn how to safely use, store and throw away your medicines at www.disposemymeds.org.          This list is accurate as of 12/4/18  3:26 PM.  Always use your most recent med list.                   Brand Name Dispense Instructions for use Diagnosis    albuterol 108 (90 Base) MCG/ACT inhaler    PROAIR HFA/PROVENTIL HFA/VENTOLIN HFA    1 Inhaler    Inhale 2 puffs into the lungs every 4 hours as needed for shortness of breath / dyspnea or wheezing    Mild intermittent asthma without complication

## 2018-12-04 NOTE — LETTER
My Asthma Action Plan  Name: Travis Fraga   YOB: 1990  Date: 12/4/2018   My doctor: ENRIQUE Eldridge CNP   [facility]        My Control Medicine: None  My Rescue Medicine: albuterol Inhaler   My Asthma Severity: mild intermittent  Avoid your asthma triggers: upper respiratory infections               GREEN ZONE   Good Control    I feel good    No cough or wheeze    Can work, sleep and play without asthma symptoms       Take your asthma control medicine every day.     1. If exercise triggers your asthma, take your rescue medication    15 minutes before exercise or sports, and    During exercise if you have asthma symptoms  2. Spacer to use with inhaler: If you have a spacer, make sure to use it with your inhaler             YELLOW ZONE Getting Worse  I have ANY of these:    I do not feel good    Cough or wheeze    Chest feels tight    Wake up at night   1. Keep taking your Green Zone medications  2. Start taking your rescue medicine:    every 20 minutes for up to 1 hour. Then every 4 hours for 24-48 hours.  3. If you stay in the Yellow Zone for more than 12-24 hours, contact your doctor.  4. If you do not return to the Green Zone in 12-24 hours or you get worse, start taking your oral steroid medicine if prescribed by your provider.           RED ZONE Medical Alert - Get Help  I have ANY of these:    I feel awful    Medicine is not helping    Breathing getting harder    Trouble walking or talking    Nose opens wide to breathe       1. Take your rescue medicine NOW  2. If your provider has prescribed an oral steroid medicine, start taking it NOW  3. Call your doctor NOW  4. If you are still in the Red Zone after 20 minutes and you have not reached your doctor:    Take your rescue medicine again and    Call 911 or go to the emergency room right away    See your regular doctor within 2 weeks of an Emergency Room or Urgent Care visit for follow-up treatment.          Annual  Reminders:  Meet with Asthma Educator,  Flu Shot in the Fall, consider Pneumonia Vaccination for patients with asthma (aged 19 and older).    Pharmacy:    [facility] [place] [address] [facility] [id number] - [place], [place] - [address] [id number] - [place], [place] - [address]                      Asthma Triggers  How To Control Things That Make Your Asthma Worse    Triggers are things that make your asthma worse.  Look at the list below to help you find your triggers and what you can do about them.  You can help prevent asthma flare-ups by staying away from your triggers.      Trigger                                                          What you can do   Cigarette Smoke  Tobacco smoke can make asthma worse. Do not allow smoking in your home, car or around you.  Be sure no one smokes at a child s day care or school.  If you smoke, ask your health care provider for ways to help you quit.  Ask family members to quit too.  Ask your health care provider for a referral to Quit Plan to help you quit smoking, or call 6-833-829-PLAN.     Colds, Flu, Bronchitis  These are common triggers of asthma. Wash your hands often.  Don t touch your eyes, nose or mouth.  Get a flu shot every year.     Dust Mites  These are tiny bugs that live in cloth or carpet. They are too small to see. Wash sheets and blankets in hot water every week.   Encase pillows and mattress in dust mite proof covers.  Avoid having carpet if you can. If you have carpet, vacuum weekly.   Use a dust mask and HEPA vacuum.   Pollen and Outdoor Mold  Some people are allergic to trees, grass, or weed pollen, or molds. Try to keep your windows closed.  Limit time out doors when pollen count is high.   Ask you health care provider about taking medicine during allergy season.     Animal Dander  Some people are allergic to skin  flakes, urine or saliva from pets with fur or feathers. Keep pets with fur or feathers out of your home.    If you can t keep the pet outdoors, then keep the pet out of your bedroom.  Keep the bedroom door closed.  Keep pets off cloth furniture and away from stuffed toys.     Mice, Rats, and Cockroaches  Some people are allergic to the waste from these pests.   Cover food and garbage.  Clean up spills and food crumbs.  Store grease in the refrigerator.   Keep food out of the bedroom.   Indoor Mold  This can be a trigger if your home has high moisture. Fix leaking faucets, pipes, or other sources of water.   Clean moldy surfaces.  Dehumidify basement if it is damp and smelly.   Smoke, Strong Odors, and Sprays  These can reduce air quality. Stay away from strong odors and sprays, such as perfume, powder, hair spray, paints, smoke incense, paint, cleaning products, candles and new carpet.   Exercise or Sports  Some people with asthma have this trigger. Be active!  Ask your doctor about taking medicine before sports or exercise to prevent symptoms.    Warm up for 5-10 minutes before and after sports or exercise.     Other Triggers of Asthma  Cold air:  Cover your nose and mouth with a scarf.  Sometimes laughing or crying can be a trigger.  Some medicines and food can trigger asthma.

## 2018-12-04 NOTE — PATIENT INSTRUCTIONS
Travis was seen today for diarrhea.    Diagnoses and all orders for this visit:    Diarrhea, unspecified type  -     Enteric Bacteria and Virus Panel by VIKI Stool; Future  -     Clostridium difficile Toxin B PCR; Future  -     Ova and Parasite Exam Routine; Future  -     CBC with platelets  -     Comprehensive metabolic panel    Flank pain  -     UA reflex to Microscopic and Culture    Will notify of results once available.

## 2018-12-04 NOTE — PROGRESS NOTES
SUBJECTIVE:   Travis Fraga is a 28 year old male who presents to clinic today for the following health issues:      Diarrhea  Onset:  11/27/18    Description:   Consistency of stool: watery and yellow and frothy  Blood in stool: no but a couple days ago he wiped and saw blood   Number of loose stools in past 24 hours: 15    Progression of Symptoms:  worsening , diarrhea has been slowing down, feels like inside are clenching up.    Has the urge to have diarrhea, but less stool output.          Accompanying Signs & Symptoms:  Fever: no   Nausea or vomiting; YES- threw up twice in the beginning, x 2 days ago last emesis  Abdominal pain: YES - intermittent waves of cramping  Episodes of constipation: no   Weight loss: YES  Decreased appetite - is scared to eat.    Has not tried BRAT diet.  Has been trying chicken noodle soup.     History:   Ill contacts: YES- household members have been sick, but none with similar symptoms   Recent use of antibiotics: no    Recent travels: no          Recent medication-new or changes(Rx or OTC): no     Precipitating factors:   Says he tried drinking a 6 pack of beer the night before but it tasted weird so he's not sure if this has anything to do with it.     Alleviating factors:   nothing    Therapies Tried and outcome:  Imodium AD and chamomile tea; Outcome: not very helpful    Mentioned he has had a HA for the last two days, it eventually went away last for 4 hours.    No one else at home with diarrhea/vomiting illness.       Problem list and histories reviewed & adjusted, as indicated.  Additional history: as documented    Patient Active Problem List   Diagnosis     Insomnia     Tobacco use disorder     CARDIOVASCULAR SCREENING; LDL GOAL LESS THAN 160     Mild intermittent asthma without complication - with illnesses     Past Surgical History:   Procedure Laterality Date     ARTHROSCOPIC RECONSTRUCTION ANTERIOR AND POSTERIOR CRUCIATE LIGAMENT, COMBINED Right     4-5 years ago   "    NO HISTORY OF SURGERY         Social History   Substance Use Topics     Smoking status: Current Every Day Smoker     Packs/day: 0.50     Years: 5.00     Types: Cigarettes     Last attempt to quit: 12/4/2007     Smokeless tobacco: Never Used      Comment: 1/2 ppd     Alcohol use No      Comment: 13 drinks per week - quit 5/2013     Family History   Problem Relation Age of Onset     Diabetes Maternal Grandmother      type 2     Diabetes Maternal Grandfather      type 2     Heart Disease Maternal Uncle          Current Outpatient Prescriptions   Medication Sig Dispense Refill     albuterol (PROAIR HFA/PROVENTIL HFA/VENTOLIN HFA) 108 (90 Base) MCG/ACT Inhaler Inhale 2 puffs into the lungs every 4 hours as needed for shortness of breath / dyspnea or wheezing (Patient not taking: Reported on 12/4/2018) 1 Inhaler 11     Allergies   Allergen Reactions     Ibuprofen      Other reaction(s): GI Bleeding     Penicillins Rash       Reviewed and updated as needed this visit by clinical staff       Reviewed and updated as needed this visit by Provider         ROS:  Constitutional, HEENT, cardiovascular, pulmonary, gi and gu systems are negative, except as otherwise noted.  MS: sharp right flank pain last evening, resolved after 2 hours  Neuro:  +headache, now resolved, no dizziness or syncope  OBJECTIVE:     /62 (BP Location: Right arm, Patient Position: Sitting, Cuff Size: Adult Regular)  Pulse 96  Temp 98.6  F (37  C) (Oral)  Ht 5' 3\" (1.6 m)  Wt 115 lb (52.2 kg)  SpO2 98%  BMI 20.37 kg/m2  Body mass index is 20.37 kg/(m^2).    GENERAL: healthy, alert and no distress  RESP: lungs clear to auscultation - no rales, rhonchi or wheezes  CV: regular rate and rhythm, normal S1 S2  ABDOMEN: soft, diffuse tenderness to palpation over right and left lower quadrants, no hepatosplenomegaly, no masses and bowel sounds normal  NEURO: Normal strength and tone, mentation intact and speech normal  PSYCH: mentation appears " normal, affect normal/bright    Diagnostic Test Results:  Results for orders placed or performed in visit on 12/04/18   CBC with platelets   Result Value Ref Range    WBC 7.1 4.0 - 11.0 10e9/L    RBC Count 5.30 4.4 - 5.9 10e12/L    Hemoglobin 16.3 13.3 - 17.7 g/dL    Hematocrit 46.6 40.0 - 53.0 %    MCV 88 78 - 100 fl    MCH 30.8 26.5 - 33.0 pg    MCHC 35.0 31.5 - 36.5 g/dL    RDW 12.6 10.0 - 15.0 %    Platelet Count 252 150 - 450 10e9/L   Comprehensive metabolic panel   Result Value Ref Range    Sodium 135 133 - 144 mmol/L    Potassium 3.7 3.4 - 5.3 mmol/L    Chloride 104 94 - 109 mmol/L    Carbon Dioxide 23 20 - 32 mmol/L    Anion Gap 8 3 - 14 mmol/L    Glucose 102 (H) 70 - 99 mg/dL    Urea Nitrogen 5 (L) 7 - 30 mg/dL    Creatinine 0.94 0.66 - 1.25 mg/dL    GFR Estimate >90 >60 mL/min/1.7m2    GFR Estimate If Black >90 >60 mL/min/1.7m2    Calcium 8.9 8.5 - 10.1 mg/dL    Bilirubin Total 0.2 0.2 - 1.3 mg/dL    Albumin 4.0 3.4 - 5.0 g/dL    Protein Total 7.4 6.8 - 8.8 g/dL    Alkaline Phosphatase 74 40 - 150 U/L    ALT 38 0 - 70 U/L    AST 35 0 - 45 U/L   UA reflex to Microscopic and Culture   Result Value Ref Range    Color Urine Yellow     Appearance Urine Clear     Glucose Urine Negative NEG^Negative mg/dL    Bilirubin Urine Negative NEG^Negative    Ketones Urine Negative NEG^Negative mg/dL    Specific Gravity Urine 1.015 1.003 - 1.035    Blood Urine Negative NEG^Negative    pH Urine 7.0 5.0 - 7.0 pH    Protein Albumin Urine Negative NEG^Negative mg/dL    Urobilinogen Urine 0.2 0.2 - 1.0 EU/dL    Nitrite Urine Negative NEG^Negative    Leukocyte Esterase Urine Negative NEG^Negative    Source Midstream Urine        ASSESSMENT/PLAN:     Travis was seen today for diarrhea.    Diagnoses and all orders for this visit:    Diarrhea, unspecified type    -     Enteric Bacteria and Virus Panel by VIKI Stool; Future  -     Clostridium difficile Toxin B PCR; Future  -     Ova and Parasite Exam Routine; Future  -     CBC with  platelets  -     Comprehensive metabolic panel    Flank pain  -     UA reflex to Microscopic and Culture    If no improvement or worsening of diarrhea, plan follow-up in clinic in 1 week.        ENRIQUE Eldridge [facility] [place]

## 2018-12-05 DIAGNOSIS — R19.7 DIARRHEA, UNSPECIFIED TYPE: ICD-10-CM

## 2018-12-05 LAB
ALBUMIN SERPL-MCNC: 4 G/DL (ref 3.4–5)
ALP SERPL-CCNC: 74 U/L (ref 40–150)
ALT SERPL W P-5'-P-CCNC: 38 U/L (ref 0–70)
ANION GAP SERPL CALCULATED.3IONS-SCNC: 8 MMOL/L (ref 3–14)
AST SERPL W P-5'-P-CCNC: 35 U/L (ref 0–45)
BILIRUB SERPL-MCNC: 0.2 MG/DL (ref 0.2–1.3)
BUN SERPL-MCNC: 5 MG/DL (ref 7–30)
C DIFF TOX B STL QL: NEGATIVE
CALCIUM SERPL-MCNC: 8.9 MG/DL (ref 8.5–10.1)
CHLORIDE SERPL-SCNC: 104 MMOL/L (ref 94–109)
CO2 SERPL-SCNC: 23 MMOL/L (ref 20–32)
CREAT SERPL-MCNC: 0.94 MG/DL (ref 0.66–1.25)
GFR SERPL CREATININE-BSD FRML MDRD: >90 ML/MIN/1.7M2
GLUCOSE SERPL-MCNC: 102 MG/DL (ref 70–99)
POTASSIUM SERPL-SCNC: 3.7 MMOL/L (ref 3.4–5.3)
PROT SERPL-MCNC: 7.4 G/DL (ref 6.8–8.8)
SODIUM SERPL-SCNC: 135 MMOL/L (ref 133–144)
SPECIMEN SOURCE: NORMAL

## 2018-12-05 PROCEDURE — 87209 SMEAR COMPLEX STAIN: CPT | Performed by: NURSE PRACTITIONER

## 2018-12-05 PROCEDURE — 87506 IADNA-DNA/RNA PROBE TQ 6-11: CPT | Performed by: NURSE PRACTITIONER

## 2018-12-05 PROCEDURE — 87493 C DIFF AMPLIFIED PROBE: CPT | Performed by: NURSE PRACTITIONER

## 2018-12-05 PROCEDURE — 87177 OVA AND PARASITES SMEARS: CPT | Performed by: NURSE PRACTITIONER

## 2018-12-06 LAB
C COLI+JEJUNI+LARI FUSA STL QL NAA+PROBE: NOT DETECTED
EC STX1 GENE STL QL NAA+PROBE: NOT DETECTED
EC STX2 GENE STL QL NAA+PROBE: NOT DETECTED
ENTERIC PATHOGEN COMMENT: NORMAL
NOROV GI+II ORF1-ORF2 JNC STL QL NAA+PR: NOT DETECTED
O+P STL MICRO: NORMAL
O+P STL MICRO: NORMAL
RVA NSP5 STL QL NAA+PROBE: NOT DETECTED
SALMONELLA SP RPOD STL QL NAA+PROBE: NOT DETECTED
SHIGELLA SP+EIEC IPAH STL QL NAA+PROBE: NOT DETECTED
SPECIMEN SOURCE: NORMAL
V CHOL+PARA RFBL+TRKH+TNAA STL QL NAA+PR: NOT DETECTED
Y ENTERO RECN STL QL NAA+PROBE: NOT DETECTED

## 2019-03-19 ENCOUNTER — OFFICE VISIT (OUTPATIENT)
Dept: FAMILY MEDICINE | Facility: CLINIC | Age: 29
End: 2019-03-19
Payer: COMMERCIAL

## 2019-03-19 VITALS
OXYGEN SATURATION: 98 % | HEIGHT: 63 IN | HEART RATE: 94 BPM | TEMPERATURE: 98.5 F | SYSTOLIC BLOOD PRESSURE: 102 MMHG | BODY MASS INDEX: 21.79 KG/M2 | DIASTOLIC BLOOD PRESSURE: 58 MMHG | WEIGHT: 123 LBS

## 2019-03-19 DIAGNOSIS — R68.84 JAW PAIN: Primary | ICD-10-CM

## 2019-03-19 PROCEDURE — 99213 OFFICE O/P EST LOW 20 MIN: CPT | Performed by: PHYSICIAN ASSISTANT

## 2019-03-19 ASSESSMENT — MIFFLIN-ST. JEOR: SCORE: 1433.05

## 2019-03-19 NOTE — PROGRESS NOTES
"  SUBJECTIVE:                                                    Travis Fraga is a 28 year old male who presents to clinic today for the following health issues:      Joint Pain    Onset: x6 days    Description:   Location: Jaw  Character: when opens jaw all they way which is hard to do feels strong ache and dull for hours after    Intensity: 4-5/10-when does not aggravate ir, 8/10 when does.    Progression of Symptoms: waxing and waning    Accompanying Signs & Symptoms:  Other symptoms: none    History:   Previous similar pain: no       Precipitating factors:   Trauma or overuse: YES- 1 wisdom tooth removed pain since novocain wore off    Alleviating factors:  Improved by: acetaminophen, ice - moderate relief    Called Dentist and was told normal to have pain after but pt has had same surgeries in past and never felt like this.  Pt is a smoker and has been smoking since surgery.     Therapies Tried and outcome: see above    [place] teeth removed, bottom right, last Wed.  He says that since the novacaine wore off he has had constant pain.  He says that the pain goes \"in and out.\"  He rates the pain as a 4-5/10 but if he is aggravating it, it is a 7-8/10.  Aggravating the jaw is from opening it all the way or after chewing.      Problem list and histories reviewed & adjusted, as indicated.  Additional history: as documented      ROS:  Constitutional, HEENT, cardiovascular, pulmonary, GI, , musculoskeletal, neuro, skin, endocrine and psych systems are negative, except as otherwise noted.    OBJECTIVE:                                                    /58 (BP Location: Right arm, Patient Position: Chair, Cuff Size: Adult Regular)   Pulse 94   Temp 98.5  F (36.9  C) (Oral)   Ht 1.6 m (5' 3\")   Wt 55.8 kg (123 lb)   SpO2 98%   BMI 21.79 kg/m    Body mass index is 21.79 kg/m .  GENERAL: healthy, alert and no distress  EYES: Eyes grossly normal to inspection, PERRL and conjunctivae and sclerae " normal  MOUTH:  No edema or erythema noted.  No drainage or infected appearing lesions seen.  There is no swelling in the face noted.    NECK: no adenopathy, no asymmetry, masses, or scars and thyroid normal to palpation  MS: no gross musculoskeletal defects noted, no edema  SKIN: no suspicious lesions or rashes  NEURO: Normal strength and tone, mentation intact and speech normal  PSYCH: mentation appears normal, affect normal/bright    Diagnostic Test Results:  none      ASSESSMENT/PLAN:                                                      Travis was seen today for jaw pain.    Diagnoses and all orders for this visit:    Jaw pain      Exam is unremarkable today and vitals are within normal limits.  Patient reassured and instructed to followup with dental clinic if pain persists.  Patient advised to seek more immediate followup if he develops any swelling, fevers or rashes.      Followup: Return in about 3 days (around 3/22/2019) for Specialty followup, If not improving, please be seen sooner if needed.    -- I have discussed the patient's diagnosis, and my plan of treatment with the patient and/or family. Patient is aware to followup if symptoms do not improve.  Patient has been advised to be seen sooner or seek more immediate care if symptoms change or worsen.  Patient agrees with and understands the plan today.     See Patient Instructions        Angelina Guillen PA-C    [facility] [place]

## 2019-03-29 ENCOUNTER — OFFICE VISIT (OUTPATIENT)
Dept: FAMILY MEDICINE | Facility: CLINIC | Age: 29
End: 2019-03-29
Payer: COMMERCIAL

## 2019-03-29 VITALS
HEIGHT: 63 IN | SYSTOLIC BLOOD PRESSURE: 120 MMHG | WEIGHT: 117 LBS | OXYGEN SATURATION: 95 % | BODY MASS INDEX: 20.73 KG/M2 | HEART RATE: 97 BPM | DIASTOLIC BLOOD PRESSURE: 60 MMHG | TEMPERATURE: 99 F

## 2019-03-29 DIAGNOSIS — J45.20 MILD INTERMITTENT ASTHMA WITHOUT COMPLICATION: ICD-10-CM

## 2019-03-29 DIAGNOSIS — R05.9 COUGH: Primary | ICD-10-CM

## 2019-03-29 LAB
FLUAV+FLUBV AG SPEC QL: NEGATIVE
FLUAV+FLUBV AG SPEC QL: NEGATIVE
SPECIMEN SOURCE: NORMAL

## 2019-03-29 PROCEDURE — 99213 OFFICE O/P EST LOW 20 MIN: CPT | Performed by: NURSE PRACTITIONER

## 2019-03-29 PROCEDURE — 87804 INFLUENZA ASSAY W/OPTIC: CPT | Performed by: NURSE PRACTITIONER

## 2019-03-29 RX ORDER — ALBUTEROL SULFATE 90 UG/1
2 AEROSOL, METERED RESPIRATORY (INHALATION) EVERY 4 HOURS PRN
Qty: 18 G | Refills: 1 | Status: SHIPPED | OUTPATIENT
Start: 2019-03-29 | End: 2021-04-26

## 2019-03-29 ASSESSMENT — MIFFLIN-ST. JEOR: SCORE: 1405.84

## 2019-03-29 NOTE — PROGRESS NOTES
influenz  SUBJECTIVE:   Travis Fraga is a 28 year old male who presents to clinic today for the following health issues:    Acute Illness   Acute illness concerns: URI  Onset: 4 days    Fever: no    Chills/Sweats: YES    Headache (location?): YES- from coughing    Sinus Pressure:YES- the first day    Conjunctivitis:  no    Ear Pain: NO    Rhinorrhea: YES - clear to yellow to dark yellow.    Congestion: YES - nasal and chest congestion.      Sore Throat: YES     Cough: YES-productive of clear sputum, productive of yellow sputum    Wheeze: YES - needs a refill of his albuterol inhaler - has ran out.     Decreased Appetite: no    Nausea: no    Vomiting: no    Diarrhea:  no    Dysuria/Freq.: no    Fatigue/Achiness: YES body aches    Sick/Strep Exposure: no     Therapies Tried and outcome: tylenol    Ran out of albuterol inhaler - needs refill - uses with illness/cold symptoms.     Problem list and histories reviewed & adjusted, as indicated.  Additional history: as documented    Patient Active Problem List   Diagnosis     Insomnia     Tobacco use disorder     CARDIOVASCULAR SCREENING; LDL GOAL LESS THAN 160     Mild intermittent asthma without complication - with illnesses     Past Surgical History:   Procedure Laterality Date     ARTHROSCOPIC RECONSTRUCTION ANTERIOR AND POSTERIOR CRUCIATE LIGAMENT, COMBINED Right     4-5 years ago      NO HISTORY OF SURGERY         Social History     Tobacco Use     Smoking status: Current Every Day Smoker     Packs/day: 0.50     Years: 5.00     Pack years: 2.50     Types: Cigarettes     Last attempt to quit: 2007     Years since quittin.3     Smokeless tobacco: Never Used     Tobacco comment:  ppd   Substance Use Topics     Alcohol use: No     Alcohol/week: 0.0 oz     Comment: 13 drinks per week - quit 2013     Family History   Problem Relation Age of Onset     Diabetes Maternal Grandmother         type 2     Diabetes Maternal Grandfather         type 2     Heart  "Disease Maternal Uncle          Current Outpatient Medications   Medication Sig Dispense Refill     albuterol (PROAIR HFA/PROVENTIL HFA/VENTOLIN HFA) 108 (90 Base) MCG/ACT inhaler Inhale 2 puffs into the lungs every 4 hours as needed for shortness of breath / dyspnea or wheezing 18 g 1     Allergies   Allergen Reactions     Ibuprofen      Other reaction(s): GI Bleeding     Penicillins Rash     Recent Labs   Lab Test 12/04/18  1533 01/03/12  1631 07/18/11  1524   LDL  --   --  34   HDL  --   --  58   TRIG  --   --  116   ALT 38 33 75*   CR 0.94 0.89 1.04*   GFRESTIMATED >90 >90 >90   GFRESTBLACK >90 >90 >90   POTASSIUM 3.7 4.0 4.3   TSH  --   --  1.45      BP Readings from Last 3 Encounters:   03/29/19 120/60   03/19/19 102/58   12/04/18 136/62    Wt Readings from Last 3 Encounters:   03/29/19 53.1 kg (117 lb)   03/19/19 55.8 kg (123 lb)   12/04/18 52.2 kg (115 lb)           Labs reviewed in EPIC    Reviewed and updated as needed this visit by clinical staff       Reviewed and updated as needed this visit by Provider         ROS:  Constitutional, HEENT, cardiovascular, pulmonary, gi and gu systems are negative, except as otherwise noted.    OBJECTIVE:     /60   Pulse 97   Temp 99  F (37.2  C) (Oral)   Ht 1.6 m (5' 3\")   Wt 53.1 kg (117 lb)   SpO2 95%   BMI 20.73 kg/m    Body mass index is 20.73 kg/m .  GENERAL: healthy, alert and no distress  EYES: Eyes grossly normal to inspection, PERRL and conjunctivae and sclerae normal  HENT: ear canals and TM's normal, nose and mouth without ulcers or lesions  NECK: no adenopathy, no asymmetry, masses, or scars and thyroid normal to palpation  RESP: lungs clear to auscultation - no rales, rhonchi or wheezes  CV: regular rate and rhythm, normal S1 S2, no S3 or S4, no murmur, click or rub, no peripheral edema and peripheral pulses strong  PSYCH: mentation appears normal, affect normal/bright  LYMPH: no cervical or supraclavicular adenopathy    Diagnostic Test " Results:  -influenza A/B swab negative.     ASSESSMENT/PLAN:   Travis was seen today for uri.    Diagnoses and all orders for this visit:    Cough  -     Influenza A/B antigen    Mild intermittent asthma without complication - with illnesses  -     albuterol (PROAIR HFA/PROVENTIL HFA/VENTOLIN HFA) 108 (90 Base) MCG/ACT inhaler; Inhale 2 puffs into the lungs every 4 hours as needed for shortness of breath / dyspnea or wheezing        See Patient Instructions  Albuterol inhaler refill given.  Likely viral, symptomatic treatment and use of albuterol inhaler for symptoms.   Return to clinic if no improvement or symptoms worsen.  Patient verbalized understanding & agreed with plan of care.    ENRIQUE Khan, CNP  [facility] [place]

## 2019-03-30 ENCOUNTER — HOSPITAL ENCOUNTER (EMERGENCY)
Facility: CLINIC | Age: 29
Discharge: HOME | End: 2019-03-30
Attending: EMERGENCY MEDICINE | Admitting: EMERGENCY MEDICINE
Payer: COMMERCIAL

## 2019-03-30 ENCOUNTER — APPOINTMENT (OUTPATIENT)
Dept: GENERAL RADIOLOGY | Facility: CLINIC | Age: 29
End: 2019-03-30
Attending: EMERGENCY MEDICINE
Payer: COMMERCIAL

## 2019-03-30 VITALS
SYSTOLIC BLOOD PRESSURE: 102 MMHG | HEART RATE: 80 BPM | RESPIRATION RATE: 22 BRPM | DIASTOLIC BLOOD PRESSURE: 68 MMHG | TEMPERATURE: 98 F | BODY MASS INDEX: 20.73 KG/M2 | OXYGEN SATURATION: 100 % | WEIGHT: 117 LBS

## 2019-03-30 DIAGNOSIS — J45.901 MILD ASTHMA WITH EXACERBATION, UNSPECIFIED WHETHER PERSISTENT: ICD-10-CM

## 2019-03-30 DIAGNOSIS — J40 BRONCHITIS: ICD-10-CM

## 2019-03-30 LAB
ANION GAP SERPL CALCULATED.3IONS-SCNC: 6 MMOL/L (ref 3–14)
BASOPHILS # BLD AUTO: 0 10E9/L (ref 0–0.2)
BASOPHILS NFR BLD AUTO: 0 %
BUN SERPL-MCNC: 6 MG/DL (ref 7–30)
CALCIUM SERPL-MCNC: 8.7 MG/DL (ref 8.5–10.1)
CHLORIDE SERPL-SCNC: 105 MMOL/L (ref 94–109)
CO2 SERPL-SCNC: 25 MMOL/L (ref 20–32)
CREAT SERPL-MCNC: 0.94 MG/DL (ref 0.66–1.25)
D DIMER PPP FEU-MCNC: 0.3 UG/ML FEU (ref 0–0.5)
DEPRECATED S PYO AG THROAT QL EIA: NORMAL
DIFFERENTIAL METHOD BLD: NORMAL
EOSINOPHIL # BLD AUTO: 0 10E9/L (ref 0–0.7)
EOSINOPHIL NFR BLD AUTO: 0 %
ERYTHROCYTE [DISTWIDTH] IN BLOOD BY AUTOMATED COUNT: 12.7 % (ref 10–15)
GFR SERPL CREATININE-BSD FRML MDRD: >90 ML/MIN/{1.73_M2}
GLUCOSE SERPL-MCNC: 91 MG/DL (ref 70–99)
HCT VFR BLD AUTO: 48.3 % (ref 40–53)
HGB BLD-MCNC: 16.8 G/DL (ref 13.3–17.7)
LYMPHOCYTES # BLD AUTO: 2 10E9/L (ref 0.8–5.3)
LYMPHOCYTES NFR BLD AUTO: 27 %
MCH RBC QN AUTO: 30.8 PG (ref 26.5–33)
MCHC RBC AUTO-ENTMCNC: 34.8 G/DL (ref 31.5–36.5)
MCV RBC AUTO: 89 FL (ref 78–100)
MONOCYTES # BLD AUTO: 0.7 10E9/L (ref 0–1.3)
MONOCYTES NFR BLD AUTO: 9 %
NEUTROPHILS # BLD AUTO: 4.7 10E9/L (ref 1.6–8.3)
NEUTROPHILS NFR BLD AUTO: 64 %
PLATELET # BLD AUTO: 194 10E9/L (ref 150–450)
PLATELET # BLD EST: NORMAL 10*3/UL
POTASSIUM SERPL-SCNC: 3.3 MMOL/L (ref 3.4–5.3)
RBC # BLD AUTO: 5.45 10E12/L (ref 4.4–5.9)
RBC MORPH BLD: NORMAL
SODIUM SERPL-SCNC: 136 MMOL/L (ref 133–144)
SPECIMEN SOURCE: NORMAL
TROPONIN I SERPL-MCNC: <0.015 UG/L (ref 0–0.04)
VARIANT LYMPHS BLD QL SMEAR: PRESENT
WBC # BLD AUTO: 7.3 10E9/L (ref 4–11)

## 2019-03-30 PROCEDURE — 36415 COLL VENOUS BLD VENIPUNCTURE: CPT | Performed by: EMERGENCY MEDICINE

## 2019-03-30 PROCEDURE — 85025 COMPLETE CBC W/AUTO DIFF WBC: CPT | Performed by: EMERGENCY MEDICINE

## 2019-03-30 PROCEDURE — 93005 ELECTROCARDIOGRAM TRACING: CPT

## 2019-03-30 PROCEDURE — 85379 FIBRIN DEGRADATION QUANT: CPT | Performed by: EMERGENCY MEDICINE

## 2019-03-30 PROCEDURE — 96360 HYDRATION IV INFUSION INIT: CPT

## 2019-03-30 PROCEDURE — 25000125 ZZHC RX 250: Performed by: EMERGENCY MEDICINE

## 2019-03-30 PROCEDURE — 25000128 H RX IP 250 OP 636: Performed by: EMERGENCY MEDICINE

## 2019-03-30 PROCEDURE — 99285 EMERGENCY DEPT VISIT HI MDM: CPT | Mod: 25

## 2019-03-30 PROCEDURE — 87880 STREP A ASSAY W/OPTIC: CPT | Performed by: EMERGENCY MEDICINE

## 2019-03-30 PROCEDURE — 71046 X-RAY EXAM CHEST 2 VIEWS: CPT

## 2019-03-30 PROCEDURE — 87081 CULTURE SCREEN ONLY: CPT | Performed by: EMERGENCY MEDICINE

## 2019-03-30 PROCEDURE — 80048 BASIC METABOLIC PNL TOTAL CA: CPT | Performed by: EMERGENCY MEDICINE

## 2019-03-30 PROCEDURE — 84484 ASSAY OF TROPONIN QUANT: CPT | Performed by: EMERGENCY MEDICINE

## 2019-03-30 RX ORDER — AZITHROMYCIN 250 MG/1
TABLET, FILM COATED ORAL
Qty: 6 TABLET | Refills: 0 | Status: SHIPPED | OUTPATIENT
Start: 2019-03-30 | End: 2019-04-04

## 2019-03-30 RX ORDER — PREDNISONE 20 MG/1
40 TABLET ORAL ONCE
Status: COMPLETED | OUTPATIENT
Start: 2019-03-30 | End: 2019-03-30

## 2019-03-30 RX ORDER — ACETAMINOPHEN 325 MG/1
325-650 TABLET ORAL EVERY 6 HOURS PRN
COMMUNITY

## 2019-03-30 RX ORDER — PREDNISONE 20 MG/1
40 TABLET ORAL DAILY
Qty: 6 TABLET | Refills: 0 | Status: SHIPPED | OUTPATIENT
Start: 2019-03-31 | End: 2019-04-03

## 2019-03-30 RX ORDER — ALBUTEROL SULFATE 0.83 MG/ML
2.5 SOLUTION RESPIRATORY (INHALATION) ONCE
Status: COMPLETED | OUTPATIENT
Start: 2019-03-30 | End: 2019-03-30

## 2019-03-30 RX ORDER — ALBUTEROL SULFATE 90 UG/1
2 AEROSOL, METERED RESPIRATORY (INHALATION) EVERY 4 HOURS PRN
Qty: 6.7 G | Refills: 0 | Status: SHIPPED | OUTPATIENT
Start: 2019-03-30 | End: 2019-04-01

## 2019-03-30 RX ADMIN — ALBUTEROL SULFATE 2.5 MG: 2.5 SOLUTION RESPIRATORY (INHALATION) at 15:45

## 2019-03-30 RX ADMIN — SODIUM CHLORIDE 1000 ML: 9 INJECTION, SOLUTION INTRAVENOUS at 16:05

## 2019-03-30 RX ADMIN — PREDNISONE 40 MG: 20 TABLET ORAL at 16:47

## 2019-03-30 ASSESSMENT — ENCOUNTER SYMPTOMS
FEVER: 1
STRIDOR: 0
HEMATURIA: 0
COUGH: 1
SORE THROAT: 1
SHORTNESS OF BREATH: 1
CHEST TIGHTNESS: 1
HEADACHES: 0
LIGHT-HEADEDNESS: 1
DYSURIA: 0

## 2019-03-30 NOTE — ED TRIAGE NOTES
Pt reports symptoms started 5 days ago. Pt first thought he had a sinus infection and then developed a cough. Pt has also felt fatigue. Main complaint is shortness of breath. Pt's grandmother states today he collapsed on the floor because he couldn't breathe. Pt has asthma, unable to fill script for his inhaler as he lost his wallet. Today pt started having diarrhea as well.

## 2019-03-30 NOTE — ED PROVIDER NOTES
History     Chief Complaint:  Shortness of Breath      HPI   Travis Fraga is a 28 year old male who presents with symptoms of shortness of breath. Patient states he started having what he believed was a sinus upper respiratory infection about 5 days ago. He also has had a productive cough at times and has been feeling more short of breath recently. [facility] and had influenza screen which was negative. Patient was given his prescription for his inhalers but wasn't able to find his insurance cards and didn't pay for it. Patient reports that he has been having times where he feels so short of breath that he feels that he is going to faint. He states he's had subjective fevers as well as fever on arrival. Patient denies any obvious chest pain, but does describe a heavy feeling at times. He also reported having some loose stools today but had no vomiting. He reports a poor appetite but has been drinking plenty of fluids. He also has had mild sore throat but he believes is mostly related to his coughing.    Allergies:    Allergies   Allergen Reactions     Ibuprofen      Other reaction(s): GI Bleeding     Penicillins Rash        Medications:        acetaminophen (TYLENOL) 325 MG tablet   albuterol (PROAIR HFA/PROVENTIL HFA/VENTOLIN HFA) 108 (90 Base) MCG/ACT inhaler       Problem List:      Patient Active Problem List    Diagnosis Date Noted     CARDIOVASCULAR SCREENING; LDL GOAL LESS THAN 160 01/16/2013     Priority: High     Mild intermittent asthma without complication - with illnesses 04/15/2016     Priority: Medium     Tobacco use disorder 02/04/2008     Priority: Medium     Insomnia 06/11/2007     Priority: Medium     Problem list name updated by automated process. Provider to review          Past Medical History:      Past Medical History:   Diagnosis Date     Abnormality of gait 1/31/2012     ASTHMA - MILD INTERMITTENT 8/19/2005     CARDIOVASCULAR SCREENING; LDL GOAL LESS THAN  160       Conduct disorder, childhood onset type      Major depressive disorder, single episode, mild (H)        Past Surgical History:      Past Surgical History:   Procedure Laterality Date     ARTHROSCOPIC RECONSTRUCTION ANTERIOR AND POSTERIOR CRUCIATE LIGAMENT, COMBINED Right     4-5 years ago      NO HISTORY OF SURGERY         Family History:      Family History   Problem Relation Age of Onset     Diabetes Maternal Grandmother         type 2     Diabetes Maternal Grandfather         type 2     Heart Disease Maternal Uncle        Social History:  Presents with a grandmother  Marital Status:  Single [1]  Social History     Tobacco Use     Smoking status: Current Every Day Smoker     Packs/day: 0.50     Years: 5.00     Pack years: 2.50     Types: Cigarettes     Last attempt to quit: 2007     Years since quittin.3     Smokeless tobacco: Never Used     Tobacco comment:  ppd   Substance Use Topics     Alcohol use: No     Alcohol/week: 0.0 oz     Comment: 13 drinks per week - quit 2013     Drug use: No        Review of Systems   Constitutional: Positive for fever.   HENT: Positive for congestion and sore throat.    Respiratory: Positive for cough, chest tightness and shortness of breath. Negative for stridor.    Cardiovascular: Negative for chest pain and leg swelling.   Genitourinary: Negative for dysuria and hematuria.   Neurological: Positive for light-headedness. Negative for headaches.   All other systems reviewed and are negative.        Physical Exam   First Vitals:  BP: 137/82  Pulse: 94  Heart Rate: 94  Temp: 98  F (36.7  C)  Resp: (!) 32  Weight: 53.1 kg (117 lb)  SpO2: 99 %      Physical Exam  Vital signs and nursing notes reviewed.     Constitutional: laying on gurney appears mildly uncomfortable  HENT: Oropharynx is clear and moist. Mild tonsillar/pharyngeal erythema without exudates or enlargement.  Eyes: Conjunctivae are normal bilaterally. Pupils equal  Neck: normal range of motion,no  stridor  Cardiovascular: Normal rate, regular rhythm, normal heart sounds.   Pulmonary/Chest: tachypnea, no obvious rales records or wheezes noted. No cough during examination.  Abdominal: Soft. Bowel sounds are normal. No tenderness to palpation. No rebound or guarding.   Musculoskeletal: No joint swelling or edema. No calf tenderness or leg swelling  Neurological: Alert and oriented. No focal weakness  Skin: Skin is warm and dry. No rash noted.   Psych: normal affect    Emergency Department Course   ECG:  Normal sinus rhythm at a rate of 75 bpm. Normal MO QRS and QT intervals. No evidence of any ST segment elevation or depression.    Laboratories (abnormal only, refer to results section for normal values):  Labs Ordered and Resulted from Time of ED Arrival Up to the Time of Departure from the ED   BASIC METABOLIC PANEL - Abnormal; Notable for the following components:       Result Value    Potassium 3.3 (*)     Urea Nitrogen 6 (*)     All other components within normal limits   CBC WITH PLATELETS DIFFERENTIAL   D DIMER QUANTITATIVE   TROPONIN I   IV ACCESS   RAPID STREP SCREEN   BETA STREP GROUP A CULTURE       Imaging Studies:  Chest XR,  PA & LAT   Final Result   IMPRESSION: Normal. No change.      LUCIANO LÓPEZ MD          Impression & Plan      Medical Decision Making:  The patient is a 28-year-old male who presented to the department with increased shortness of breath and near syncope. On examination he had no hypotension or hypoxia or significant tachycardia. There is no fever noted today.Patient was given a DuoNeb treatment and IV fluids with improvement of his symptoms. I did obtain serum lab tests which were negative. He had an influenza screen yesterday which is negative. Patient does smoke and is likely having bronchitis with associated wheezing. I believe this sensation of shortness of breath and wheezing may be causing some hyperventilation which may be making him lightheaded. There is no clear  indication he has any dangerous pulmonary or cardiac cause of symptoms which would suggest need of further testing.I recommended that he start a Z-Rossy and uses albuterol inhaler 2 puffs every 4 hours. He is also to continue prednisone starting tomorrow for the next 3 days. I advised him to follow-up his primary care physician in 2 days for recheck or return if worsening. Recheck patient was quite comfortable and no signs of any respiratory distress and felt he was safe for discharge home.      Diagnosis:    ICD-10-CM    1. Bronchitis J40    2. Mild asthma with exacerbation, unspecified whether persistent J45.901        Disposition:  discharged to home    Discharge Medications:     Medication List      Started    azithromycin 250 MG tablet  Commonly known as:  ZITHROMAX Z-ROSSY  Two tablets on the first day, then one tablet daily for the next 4 days     predniSONE 20 MG tablet  Commonly known as:  DELTASONE  40 mg, Oral, DAILY  Start taking on:  3/31/2019        Modified    * albuterol 108 (90 Base) MCG/ACT inhaler  Commonly known as:  PROAIR HFA/PROVENTIL HFA/VENTOLIN HFA  2 puffs, Inhalation, EVERY 4 HOURS PRN  What changed:  Another medication with the same name was added. Make sure you understand how and when to take each.     * albuterol 108 (90 Base) MCG/ACT inhaler  Commonly known as:  PROAIR HFA/PROVENTIL HFA/VENTOLIN HFA  2 puffs, Inhalation, EVERY 4 HOURS PRN  What changed:  You were already taking a medication with the same name, and this prescription was added. Make sure you understand how and when to take each.         * This list has 2 medication(s) that are the same as other medications prescribed for you. Read the directions carefully, and ask your doctor or other care provider to review them with you.                  Fernandez Torres  3/30/2019   [facility] EMERGENCY DEPARTMENT       Fernandez Torres MD  03/30/19 1429

## 2019-03-30 NOTE — ED AVS SNAPSHOT
[facility] Emergency Department  [address]  [facility] [address]  Phone:  [phone]  Fax:  [phone]                                    Travis Fraga   MRN: [id number]    Department:  [facility] Emergency Department   Date of Visit:  3/30/2019           After Visit Summary Signature Page    I have received my discharge instructions, and my questions have been answered. I have discussed any challenges I see with this plan with the nurse or doctor.    ..........................................................................................................................................  Patient/Patient Representative Signature      ..........................................................................................................................................  Patient Representative Print Name and Relationship to Patient    ..................................................               ................................................  Date                                   Time    ..........................................................................................................................................  Reviewed by Signature/Title    ...................................................              ..............................................  Date                                               Time          22EPIC Rev 08/18

## 2019-03-30 NOTE — ED TRIAGE NOTES
Grandma also report pt felt very warm last night so she gave him 2 tylenol, no thermometer at home.

## 2019-03-31 LAB — INTERPRETATION ECG - MUSE: NORMAL

## 2019-04-01 LAB
BACTERIA SPEC CULT: NORMAL
SPECIMEN SOURCE: NORMAL

## 2019-04-01 NOTE — RESULT ENCOUNTER NOTE
Final Beta strep group A r/o culture is NEGATIVE for Group A streptococcus.    No treatment or change in treatment per [facility] Strep protocol.

## 2020-02-16 ENCOUNTER — HEALTH MAINTENANCE LETTER (OUTPATIENT)
Age: 30
End: 2020-02-16

## 2020-08-04 ENCOUNTER — HOSPITAL ENCOUNTER (EMERGENCY)
Facility: CLINIC | Age: 30
Discharge: HOME | End: 2020-08-04
Attending: PHYSICIAN ASSISTANT | Admitting: PHYSICIAN ASSISTANT
Payer: MEDICAID

## 2020-08-04 VITALS
DIASTOLIC BLOOD PRESSURE: 81 MMHG | RESPIRATION RATE: 16 BRPM | SYSTOLIC BLOOD PRESSURE: 142 MMHG | OXYGEN SATURATION: 98 % | TEMPERATURE: 97.8 F

## 2020-08-04 DIAGNOSIS — L03.213 PERIORBITAL CELLULITIS OF LEFT EYE: ICD-10-CM

## 2020-08-04 DIAGNOSIS — H00.024 HORDEOLUM INTERNUM OF LEFT UPPER EYELID: ICD-10-CM

## 2020-08-04 PROCEDURE — 25000125 ZZHC RX 250: Performed by: PHYSICIAN ASSISTANT

## 2020-08-04 PROCEDURE — 99283 EMERGENCY DEPT VISIT LOW MDM: CPT

## 2020-08-04 RX ORDER — ERYTHROMYCIN 5 MG/G
0.5 OINTMENT OPHTHALMIC
Qty: 1 G | Refills: 0 | Status: SHIPPED | OUTPATIENT
Start: 2020-08-04 | End: 2021-05-17

## 2020-08-04 RX ORDER — TETRACAINE HYDROCHLORIDE 5 MG/ML
2 SOLUTION OPHTHALMIC ONCE
Status: DISCONTINUED | OUTPATIENT
Start: 2020-08-04 | End: 2020-08-04 | Stop reason: HOSPADM

## 2020-08-04 RX ORDER — CLINDAMYCIN HCL 150 MG
450 CAPSULE ORAL 3 TIMES DAILY
Qty: 90 CAPSULE | Refills: 0 | Status: SHIPPED | OUTPATIENT
Start: 2020-08-04 | End: 2020-08-14

## 2020-08-04 ASSESSMENT — ENCOUNTER SYMPTOMS
EYE PAIN: 1
PHOTOPHOBIA: 1
EYE ITCHING: 1
EYE REDNESS: 0
EYE DISCHARGE: 1
FACIAL SWELLING: 1

## 2020-08-04 NOTE — ED AVS SNAPSHOT
[facility] Emergency Department  [address]  [facility] [address]  Phone:  [phone]  Fax:  [phone]                                    Travis Fraga   MRN: [id number]    Department:  [facility] Emergency Department   Date of Visit:  8/4/2020           After Visit Summary Signature Page    I have received my discharge instructions, and my questions have been answered. I have discussed any challenges I see with this plan with the nurse or doctor.    ..........................................................................................................................................  Patient/Patient Representative Signature      ..........................................................................................................................................  Patient Representative Print Name and Relationship to Patient    ..................................................               ................................................  Date                                   Time    ..........................................................................................................................................  Reviewed by Signature/Title    ...................................................              ..............................................  Date                                               Time          22EPIC Rev 08/18

## 2020-08-04 NOTE — ED TRIAGE NOTES
Three days of left eye pain and periorbital swelling.  No redness noted.  Pt states it feels like there's something in his eye.
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
## 2020-08-05 NOTE — DISCHARGE INSTRUCTIONS
Use hot compresses to the eyelid at least 3-4 times per day.   Use erythromycin eye ointment, five times daily.   Start Clindamycin with probiotic or at least Greek yogurt.   If not improving, see eye doctor. They may stop Clindamycin if your eye is improving.

## 2020-10-05 ENCOUNTER — HOSPITAL ENCOUNTER (EMERGENCY)
Facility: CLINIC | Age: 30
Discharge: HOME | End: 2020-10-05
Attending: EMERGENCY MEDICINE | Admitting: EMERGENCY MEDICINE
Payer: MEDICAID

## 2020-10-05 VITALS
TEMPERATURE: 98.4 F | SYSTOLIC BLOOD PRESSURE: 129 MMHG | DIASTOLIC BLOOD PRESSURE: 89 MMHG | HEART RATE: 114 BPM | OXYGEN SATURATION: 98 %

## 2020-10-05 DIAGNOSIS — F10.10 ALCOHOL ABUSE: ICD-10-CM

## 2020-10-05 DIAGNOSIS — F32.A DEPRESSION WITH SUICIDAL IDEATION: ICD-10-CM

## 2020-10-05 DIAGNOSIS — R45.851 DEPRESSION WITH SUICIDAL IDEATION: ICD-10-CM

## 2020-10-05 PROCEDURE — 99285 EMERGENCY DEPT VISIT HI MDM: CPT | Mod: 25

## 2020-10-05 PROCEDURE — 90791 PSYCH DIAGNOSTIC EVALUATION: CPT

## 2020-10-05 NOTE — ED NOTES
PT belongings in locker 52, Sitter outside of room keeping close observation on pt. Security searched pt
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
## 2020-10-05 NOTE — ED AVS SNAPSHOT
[facility] Emergency Dept  [address]  [facility] [address]  Phone: [phone]  Fax: [phone]                                    Travis Fraga   MRN: [id number]    Department: [facility] Emergency Dept   Date of Visit: 10/5/2020           After Visit Summary Signature Page    I have received my discharge instructions, and my questions have been answered. I have discussed any challenges I see with this plan with the nurse or doctor.    ..........................................................................................................................................  Patient/Patient Representative Signature      ..........................................................................................................................................  Patient Representative Print Name and Relationship to Patient    ..................................................               ................................................  Date                                   Time    ..........................................................................................................................................  Reviewed by Signature/Title    ...................................................              ..............................................  Date                                               Time          22EPIC Rev 08/18

## 2020-10-05 NOTE — ED TRIAGE NOTES
Feeling increasingly depressed over the past couple of days. Girlfriend reports she called police last night due to concerns that he was going to hurt himself. Suicide attempt last year. Denies current plan.

## 2020-11-22 ENCOUNTER — HEALTH MAINTENANCE LETTER (OUTPATIENT)
Age: 30
End: 2020-11-22

## 2021-01-25 ENCOUNTER — HOSPITAL ENCOUNTER (EMERGENCY)
Facility: CLINIC | Age: 31
Discharge: HOME | End: 2021-01-25
Attending: EMERGENCY MEDICINE | Admitting: EMERGENCY MEDICINE
Payer: MEDICAID

## 2021-01-25 VITALS
SYSTOLIC BLOOD PRESSURE: 122 MMHG | DIASTOLIC BLOOD PRESSURE: 68 MMHG | HEIGHT: 63 IN | HEART RATE: 95 BPM | BODY MASS INDEX: 20.38 KG/M2 | OXYGEN SATURATION: 100 % | RESPIRATION RATE: 16 BRPM | WEIGHT: 115 LBS | TEMPERATURE: 98 F

## 2021-01-25 DIAGNOSIS — L02.214 CUTANEOUS ABSCESS OF GROIN: ICD-10-CM

## 2021-01-25 PROCEDURE — 10060 I&D ABSCESS SIMPLE/SINGLE: CPT

## 2021-01-25 PROCEDURE — 99284 EMERGENCY DEPT VISIT MOD MDM: CPT | Mod: 25

## 2021-01-25 PROCEDURE — 250N000013 HC RX MED GY IP 250 OP 250 PS 637: Performed by: EMERGENCY MEDICINE

## 2021-01-25 RX ORDER — BUPIVACAINE HYDROCHLORIDE 5 MG/ML
INJECTION, SOLUTION PERINEURAL
Status: DISCONTINUED
Start: 2021-01-25 | End: 2021-01-25 | Stop reason: HOSPADM

## 2021-01-25 RX ORDER — DOXYCYCLINE 100 MG/1
100 CAPSULE ORAL 2 TIMES DAILY
Qty: 14 CAPSULE | Refills: 0 | Status: SHIPPED | OUTPATIENT
Start: 2021-01-25 | End: 2021-05-17

## 2021-01-25 RX ORDER — ACETAMINOPHEN 500 MG
1000 TABLET ORAL ONCE
Status: COMPLETED | OUTPATIENT
Start: 2021-01-25 | End: 2021-01-25

## 2021-01-25 RX ADMIN — ACETAMINOPHEN 1000 MG: 500 TABLET, FILM COATED ORAL at 16:11

## 2021-01-25 ASSESSMENT — ENCOUNTER SYMPTOMS: FEVER: 0

## 2021-01-25 ASSESSMENT — MIFFLIN-ST. JEOR: SCORE: 1386.77

## 2021-01-25 NOTE — ED TRIAGE NOTES
Pt presents for complaint of a painful lump in his groin. Pt states it has been there for approx 1 week but increasing and size and pain. Denies drainage from the area. Able to urinate without difficulty. ABC intact, A&Ox4.

## 2021-01-25 NOTE — ED PROVIDER NOTES
"  History   Chief Complaint:  Groin Pain       HPI   Travis Fraga is a 30 year old male who presents with groin pain. The patient states that he developed a boil in his groin a week ago. He has had no drainage from the boil but states that it has been getting worse and painful. He denies any fevers.  No history of recurrent abscesses, though notes possible cyst to his cheek about 10 years ago.    Review of Systems   Constitutional: Negative for fever.   Genitourinary:        Groin pain   Skin:        Boil in groin   All other systems reviewed and are negative.       Allergies:  Ibuprofen  Penicillins    Medications:  The patient is not on any medications.     Past Medical History:    Major depressive disorder, single episode, mild   Asthma     Past Surgical History:    ACL reconstruction     Social History:  The patient presents with his grandmother.   The patient smokes 1.5 packs a day.     Physical Exam     Patient Vitals for the past 24 hrs:   BP Temp Pulse Resp SpO2 Height Weight   01/25/21 1532 133/81 98  F (36.7  C) 106 16 100 % 1.6 m (5' 3\") 52.2 kg (115 lb)       Physical Exam  General:              Well-nourished              Speaking in full sentences  Eyes:              Conjunctiva without injection or scleral icterus  ENT:              Moist mucous membranes              Nares patent              Pinnae normal  Neck:              Full ROM              No stiffness appreciated  Resp:              Lungs CTAB              No crackles, wheezing or audible rubs              Good air movement  CV:                    Normal rate, regular rhythm              S1 and S2 present              No murmur, gallop or rub  GI:              BS present              Abdomen soft without distention              Non-tender to light and deep palpation              No guarding or rebound tenderness  Skin:              Warm, dry, well perfused              Fullness, tenderness, and fluctuance to left inguinal region   No open " wounds or expressible drainage  :   Circumcised male   Testes palpable bilaterally   Non-tender, no overlying swelling  MSK:              Moves all extremities              No focal deformities or swelling  Neuro:              Alert              Answers questions appropriately              Moves all extremities equally              Gait stable  Psych:              Normal affect, normal mood    Emergency Department Course       Procedures    Incision and Drainage     LOCATIONS:  Left inguinal region     ANESTHESIA:  Local field block using Marcaine 0.5% plain, total of 4 mLs     PREPARATION:  Cleansed with Betadine     PROCEDURE:  Area was incised with # 11 Blade (Sharp Point) with a Single Straight incision.  Wound treatment included Deloculation, Purulent Drainage and Expression of Material.  Packing consisted of Plain Gauze.  Appropriate dressing was applied to cover the area.    PATIENT STATUS:        Patient tolerated the procedure well. There were no complications.              Emergency Department Course:    Reviewed:  I reviewed the patient's nursing notes, vitals, past medical records, Care Everywhere.     Assessments:  1600  I performed an exam of the patient as documented above.   1640 I performed an I&D as noted above.     Interventions:  1611 Tylenol 1000 mg oral     Disposition:  Discharged to home.      Impression & Plan     Medical Decision Making:  Travis Fraga is a 30 year old male who presents to the emergency department for evaluation of a skin infection / wound.  VS on presentation reveal elevated BP and HR.  Differential diagnosis is includes cutaneous abscess, cellulitis, necrotizing fasciitis, aneurysm, cyst, hematoma, among others.  Based on the above history and examination, findings are most suspicious for cutaneous abscess.  Bedside US was used (see above) to evaluate subcutaneous tissues, and revealed subcutaneous fluid collection amenable to incision and drainage.  After adequate  anesthesia was achieved, this was performed as above, which the patient tolerated without difficulty.   At this time, there are signs of surrounding cellulitis.  He is not showing signs of sepsis/systemic illness.  Wound was not caused by a mammalian bite.  He does not have an underlying immunocompromising condition other than tobacco use.  For these reasons, patient will be started on antibiotics.    I reviewed the recommended treatment plan with the patient.  Patient is felt safe for discharge home and close outpatient follow-up at this time.  Wound care instructions were discussed, as were signs/symptoms suggestive of developing infection.  Patient was encouraged to follow-up with PCP in 24-48 hours for repeat wound check and consideration of packing removal.  They were welcomed to return to the ER with worsening pain, increased drainage, spreading redness, fevers, chills, vomiting, or any other new or troubling symptoms.  Questions were answered prior to discharge.       Diagnosis:    ICD-10-CM    1. Cutaneous abscess of groin  L02.214        Discharge Medications:  New Prescriptions    DOXYCYCLINE HYCLATE (VIBRAMYCIN) 100 MG CAPSULE    Take 1 capsule (100 mg) by mouth 2 times daily       Scribe Disclosure:  I, Fransico Adrian, am serving as a scribe at 4:00 PM on 1/25/2021 to document services personally performed by Moses Zarco MD based on my observations and the provider's statements to me.        Moses Zarco MD  01/25/21 9779

## 2021-01-25 NOTE — DISCHARGE INSTRUCTIONS
Follow-up:  Please follow-up in 24-48 hours for re-evaluation and discussion of your visit to the emergency department today.  At that time, your wound should be re-evaluated, and packing may be removed.  If there is ongoing pus from the abscess, it may require ongoing backing.    Home treatments:  Recommended home therapies include:    1.  Follow-up as directed is VERY important    2.  Once packing is removed, warm soaks for 2-3 days    3.  Monitor for signs of spreading infection (redness, warmth, drainage of more pus, fever, chills)    New prescriptions:  Doxycycline    Return precautions:  Warning signs which should prompt you to return to the ER include worsening pain, spreading redness, fevers, drainage of pus, or any other new or troubling symptoms.  We are always happy to see you again.

## 2021-01-26 ENCOUNTER — OFFICE VISIT (OUTPATIENT)
Dept: FAMILY MEDICINE | Facility: CLINIC | Age: 31
End: 2021-01-26
Payer: MEDICAID

## 2021-01-26 VITALS
HEIGHT: 63 IN | WEIGHT: 124 LBS | TEMPERATURE: 98.3 F | BODY MASS INDEX: 21.97 KG/M2 | DIASTOLIC BLOOD PRESSURE: 60 MMHG | SYSTOLIC BLOOD PRESSURE: 98 MMHG | OXYGEN SATURATION: 100 % | HEART RATE: 103 BPM

## 2021-01-26 DIAGNOSIS — L02.214 ABSCESS OF GROIN, LEFT: Primary | ICD-10-CM

## 2021-01-26 DIAGNOSIS — J45.20 MILD INTERMITTENT ASTHMA WITHOUT COMPLICATION: ICD-10-CM

## 2021-01-26 PROCEDURE — 99213 OFFICE O/P EST LOW 20 MIN: CPT | Performed by: PHYSICIAN ASSISTANT

## 2021-01-26 ASSESSMENT — ANXIETY QUESTIONNAIRES
3. WORRYING TOO MUCH ABOUT DIFFERENT THINGS: NOT AT ALL
2. NOT BEING ABLE TO STOP OR CONTROL WORRYING: NOT AT ALL
GAD7 TOTAL SCORE: 0
5. BEING SO RESTLESS THAT IT IS HARD TO SIT STILL: NOT AT ALL
1. FEELING NERVOUS, ANXIOUS, OR ON EDGE: NOT AT ALL
7. FEELING AFRAID AS IF SOMETHING AWFUL MIGHT HAPPEN: NOT AT ALL
6. BECOMING EASILY ANNOYED OR IRRITABLE: NOT AT ALL
IF YOU CHECKED OFF ANY PROBLEMS ON THIS QUESTIONNAIRE, HOW DIFFICULT HAVE THESE PROBLEMS MADE IT FOR YOU TO DO YOUR WORK, TAKE CARE OF THINGS AT HOME, OR GET ALONG WITH OTHER PEOPLE: NOT DIFFICULT AT ALL

## 2021-01-26 ASSESSMENT — PATIENT HEALTH QUESTIONNAIRE - PHQ9
5. POOR APPETITE OR OVEREATING: NOT AT ALL
SUM OF ALL RESPONSES TO PHQ QUESTIONS 1-9: 0

## 2021-01-26 ASSESSMENT — MIFFLIN-ST. JEOR: SCORE: 1427.59

## 2021-01-26 NOTE — PATIENT INSTRUCTIONS
Gentle cleansing twice daily with soap and warm water.  Can begin warm soapy soaks in approx two days.  Keep gauze on the area to detect increase in drainage  Complete your antibiotic as prescribed    Follow-up if pain increases, fevers develop, or other new symptoms develop  Also advise follow-up if abscess re-forms

## 2021-01-26 NOTE — PROGRESS NOTES
"  Assessment & Plan   There are no diagnoses linked to this encounter.    {2021 E&M time:826846}  {Tests, documents, or independent historian(s) (Optional):686731}  {Independent interpretation of tests (Optional):554198::\"Independent interpretation of a test performed by another physician/other qualified health care professional (not separately reported) - ***\"}  {Discussion of management or test interpretation (Optional):186103::\"Discussion of management or test interpretation with external physician/other qualified healthcare professional/appropriate source - ***\"}  {Diagnosis or treatment significantly limited by social determinants (optional):655235::\"Diagnosis or treatment significantly limited by social determinants of health - ***\"}             Tobacco Cessation:   reports that he has been smoking cigarettes. He has a 2.50 pack-year smoking history. He has never used smokeless tobacco.  {Tobacco Cessation needed for ACO -- Delete if patient is a non-smoker:013324}      {FOLLOW UP PLANS (Optional):246329}      No follow-ups on file.      Isrrael Bonner MD      [address] [address]  [employer]   Office: [phone]       Nick Mulilgan is a 30 year old who presents to clinic today for the following health issues     HPI       ED/UC Followup:    Facility:  [facility] Emergency Dept  Date of visit: 1/25/21   Reason for visit: Cutaneous abscess of groin  Current Status: ***       {additonal problems for provider to add (Optional):122624}    Review of Systems   {ROS COMP (Optional):514486}      Objective    There were no vitals taken for this visit.  There is no height or weight on file to calculate BMI.  Physical Exam   {Exam List (Optional):388534}    {Diagnostic Test Results (Optional):501751}    {AMBULATORY ATTESTATION (Optional):706905}        "

## 2021-01-26 NOTE — PROGRESS NOTES
"  Assessment & Plan     Abscess of groin, left  Packing removed today. No drainage expressed after packing removed, so wound was not repacked. Continue antibiotic as prescribed. Discussed gentle cleansing of the area, and should be able to start warm soaks in the next couple of days. Return precautions discussed (see patient instructions).    Mild intermittent asthma without complication - with illnesses  ACT updated today. Asthma under good control.    15 minutes spent on the date of the encounter doing chart review, history and exam, documentation and further activities as noted above        See Patient Instructions    No follow-ups on file.    Katie Mcdowell PA-C  [facility] FARHAN Mulligan is a 30 year old who presents to clinic today for the following health issues     HPI     ED/UC Followup:    Facility:  [facility] Emergency Dept  Date of visit: 1/25/21   Reason for visit: Cutaneous abscess of groin  Current Status: Still hurts - needs packing removed      Left groin abscess drained approx 24 hours ago  Here for recheck and possible packing removal  Has noticed some drainage since he went home from the ED  Denies fevers  Area is still sore, particularly so when he changes his bandage  No previous history of similar problem, but his father has had abscesses in the past  He has taken 3 doses of doxycycline so far    Review of Systems   Constitutional, HEENT, cardiovascular, pulmonary, gi and gu systems are negative, except as otherwise noted.      Objective    BP 98/60   Pulse 103   Temp 98.3  F (36.8  C)   Ht 1.6 m (5' 3\")   Wt 56.2 kg (124 lb)   SpO2 100%   BMI 21.97 kg/m    Body mass index is 21.97 kg/m .  Physical Exam   GENERAL: healthy, alert and no distress  MS: no gross musculoskeletal defects noted, no edema  SKIN: Approx 1 cm incision in left groin area. Approx 6 inches of packing was removed from drainage site, with some purulent and bloody " material on it. After packing removal, drainage site was gently palpated, with no drainage expressed. Wound was dressed with nonadherent gauze and tape  PSYCH: mentation appears normal, affect normal/bright    Diagnostics: None

## 2021-01-27 ASSESSMENT — ANXIETY QUESTIONNAIRES: GAD7 TOTAL SCORE: 0

## 2021-01-27 ASSESSMENT — ASTHMA QUESTIONNAIRES: ACT_TOTALSCORE: 24

## 2021-04-04 ENCOUNTER — HEALTH MAINTENANCE LETTER (OUTPATIENT)
Age: 31
End: 2021-04-04

## 2021-04-13 ENCOUNTER — OFFICE VISIT (OUTPATIENT)
Dept: FAMILY MEDICINE | Facility: CLINIC | Age: 31
End: 2021-04-13
Payer: COMMERCIAL

## 2021-04-13 VITALS
BODY MASS INDEX: 22.15 KG/M2 | DIASTOLIC BLOOD PRESSURE: 56 MMHG | HEIGHT: 63 IN | TEMPERATURE: 99.2 F | SYSTOLIC BLOOD PRESSURE: 102 MMHG | OXYGEN SATURATION: 98 % | RESPIRATION RATE: 12 BRPM | HEART RATE: 107 BPM | WEIGHT: 125 LBS

## 2021-04-13 DIAGNOSIS — J45.20 MILD INTERMITTENT ASTHMA WITHOUT COMPLICATION: ICD-10-CM

## 2021-04-13 DIAGNOSIS — F17.200 TOBACCO USE DISORDER: ICD-10-CM

## 2021-04-13 DIAGNOSIS — G47.00 INSOMNIA, UNSPECIFIED TYPE: ICD-10-CM

## 2021-04-13 DIAGNOSIS — M67.431 GANGLION OF WRIST, RIGHT: Primary | ICD-10-CM

## 2021-04-13 PROCEDURE — 99213 OFFICE O/P EST LOW 20 MIN: CPT | Performed by: FAMILY MEDICINE

## 2021-04-13 ASSESSMENT — MIFFLIN-ST. JEOR: SCORE: 1432.13

## 2021-04-13 ASSESSMENT — PAIN SCALES - GENERAL: PAINLEVEL: NO PAIN (0)
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
## 2021-04-14 ASSESSMENT — ASTHMA QUESTIONNAIRES: ACT_TOTALSCORE: 23

## 2021-04-20 ENCOUNTER — NURSE TRIAGE (OUTPATIENT)
Dept: NURSING | Facility: CLINIC | Age: 31
End: 2021-04-20

## 2021-04-20 DIAGNOSIS — J45.909 ASTHMA: Primary | ICD-10-CM

## 2021-04-20 DIAGNOSIS — J45.20 MILD INTERMITTENT ASTHMA WITHOUT COMPLICATION: ICD-10-CM

## 2021-04-20 RX ORDER — ALBUTEROL SULFATE 90 UG/1
1-2 AEROSOL, METERED RESPIRATORY (INHALATION) EVERY 6 HOURS PRN
Qty: 18 G | Refills: 0 | Status: SHIPPED | OUTPATIENT
Start: 2021-04-20 | End: 2021-05-17

## 2021-04-20 NOTE — TELEPHONE ENCOUNTER
"Patient calling for his refill of Albuterol inhaler.  He is having some mild increased sob with activity and some occasional wheezes, but denies current asthma attack.      Patient is requesting a refill of his Albuterol inhaler, but his last prescription was 3-29-19.  Paged on-call provider who prescribed one inhaler and recommended patient follow up with his PCP.      Patient updated and verbalized understanding.    Xiomy Potter RN  [facility]       Additional Information    Negative: [1] Breathing stopped AND [2] hasn't returned    Negative: Choking on something    Negative: Severe difficulty breathing (e.g., struggling for each breath, speaks in single words)    Negative: Bluish (or gray) lips or face now    Negative: Difficult to awaken or acting confused (e.g., disoriented, slurred speech)    Negative: Passed out (i.e., lost consciousness, collapsed and was not responding)    Negative: Wheezing started suddenly after medicine, an allergic food or bee sting    Negative: Stridor    Negative: Slow, shallow and weak breathing    Negative: Sounds like a life-threatening emergency to the triager    Negative: [1] MODERATE difficulty breathing (e.g., speaks in phrases, SOB even at rest, pulse 100-120) AND [2] NEW-onset or WORSE than normal    Negative: Wheezing can be heard across the room    Negative: Drooling or spitting out saliva (because can't swallow)    Negative: History of prior \"blood clot\" in leg or lungs (i.e., deep vein thrombosis, pulmonary embolism)    Negative: History of inherited increased risk of blood clots (e.g., Factor 5 Leiden, Anti-thrombin 3, Protein C or Protein S deficiency, Prothrombin mutation)    Negative: Recent illness requiring prolonged bedrest (i.e., immobilization)    Negative: Hip or leg fracture in past 2 months (e.g., had cast on leg or ankle)    Negative: Major surgery in the past month    Negative: Recent long-distance travel with prolonged time in car, bus, " "plane, or train (i.e., within past 2 weeks; 6 or  more hours duration)    Negative: Extra heart beats OR irregular heart beating   (i.e., \"palpitations\")    Negative: Fever > 103 F (39.4 C)    Negative: [1] Fever > 101 F (38.3 C) AND [2] age > 60    Negative: [1] Fever > 100.0 F (37.8 C) AND [2] bedridden (e.g., nursing home patient, CVA, chronic illness, recovering from surgery)    Negative: [1] Fever > 100.0 F (37.8 C) AND [2] diabetes mellitus or weak immune system (e.g., HIV positive, cancer chemo, splenectomy, organ transplant, chronic steroids)    Negative: [1] Periods where breathing stops and then resumes normally AND [2] bedridden (e.g., nursing home patient, CVA)    Negative: Pregnant or postpartum (< 1 month since delivery)    Negative: Patient sounds very sick or weak to the triager    [1] MILD difficulty breathing (e.g., minimal/no SOB at rest, SOB with walking, pulse <100) AND [2] NEW-onset or WORSE than normal    Protocols used: BREATHING DIFFICULTY-A-AH      "

## 2021-04-21 NOTE — TELEPHONE ENCOUNTER
LOV: 4/13/2021  Patient due for physical  No future appt scheduled    Routing to [facility] to assist in scheduling      Alisia Little RN  [facility]

## 2021-04-22 NOTE — TELEPHONE ENCOUNTER
Patient starting new job, will call to schedule appt once he knows his schedule       Abigail Suarez

## 2021-04-26 RX ORDER — ALBUTEROL SULFATE 90 UG/1
2 AEROSOL, METERED RESPIRATORY (INHALATION) EVERY 4 HOURS PRN
Qty: 18 G | Refills: 0 | Status: SHIPPED | OUTPATIENT
Start: 2021-04-26

## 2021-05-17 ENCOUNTER — VIRTUAL VISIT (OUTPATIENT)
Dept: FAMILY MEDICINE | Facility: CLINIC | Age: 31
End: 2021-05-17
Payer: COMMERCIAL

## 2021-05-17 ENCOUNTER — ANCILLARY PROCEDURE (OUTPATIENT)
Dept: GENERAL RADIOLOGY | Facility: CLINIC | Age: 31
End: 2021-05-17
Attending: PHYSICIAN ASSISTANT
Payer: COMMERCIAL

## 2021-05-17 ENCOUNTER — OFFICE VISIT (OUTPATIENT)
Dept: PEDIATRICS | Facility: CLINIC | Age: 31
End: 2021-05-17
Payer: COMMERCIAL

## 2021-05-17 ENCOUNTER — NURSE TRIAGE (OUTPATIENT)
Dept: NURSING | Facility: CLINIC | Age: 31
End: 2021-05-17

## 2021-05-17 VITALS
SYSTOLIC BLOOD PRESSURE: 120 MMHG | TEMPERATURE: 97.9 F | HEART RATE: 93 BPM | BODY MASS INDEX: 23 KG/M2 | DIASTOLIC BLOOD PRESSURE: 62 MMHG | OXYGEN SATURATION: 98 % | HEIGHT: 62 IN | WEIGHT: 125 LBS

## 2021-05-17 DIAGNOSIS — R19.7 VOMITING AND DIARRHEA: ICD-10-CM

## 2021-05-17 DIAGNOSIS — R52 BODY ACHES: ICD-10-CM

## 2021-05-17 DIAGNOSIS — R07.9 CHEST PAIN, UNSPECIFIED TYPE: Primary | ICD-10-CM

## 2021-05-17 DIAGNOSIS — J45.21 MILD INTERMITTENT ASTHMA WITH EXACERBATION: ICD-10-CM

## 2021-05-17 DIAGNOSIS — Z20.822 EXPOSURE TO COVID-19 VIRUS: ICD-10-CM

## 2021-05-17 DIAGNOSIS — R07.9 CHEST PAIN, UNSPECIFIED TYPE: ICD-10-CM

## 2021-05-17 DIAGNOSIS — R11.10 VOMITING AND DIARRHEA: ICD-10-CM

## 2021-05-17 DIAGNOSIS — R06.02 SOB (SHORTNESS OF BREATH): ICD-10-CM

## 2021-05-17 DIAGNOSIS — E87.6 HYPOKALEMIA: ICD-10-CM

## 2021-05-17 DIAGNOSIS — J20.9 ACUTE BRONCHITIS WITH COEXISTING CONDITION REQUIRING PROPHYLACTIC TREATMENT: Primary | ICD-10-CM

## 2021-05-17 DIAGNOSIS — Z72.0 TOBACCO ABUSE DISORDER: ICD-10-CM

## 2021-05-17 LAB
ALBUMIN SERPL-MCNC: 3.6 G/DL (ref 3.4–5)
ALBUMIN UR-MCNC: NEGATIVE MG/DL
ALP SERPL-CCNC: 90 U/L (ref 40–150)
ALT SERPL W P-5'-P-CCNC: 45 U/L (ref 0–70)
AMYLASE SERPL-CCNC: 52 U/L (ref 30–110)
ANION GAP SERPL CALCULATED.3IONS-SCNC: 4 MMOL/L (ref 3–14)
APPEARANCE UR: CLEAR
AST SERPL W P-5'-P-CCNC: 33 U/L (ref 0–45)
BACTERIA #/AREA URNS HPF: ABNORMAL /HPF
BASOPHILS # BLD AUTO: 0.1 10E9/L (ref 0–0.2)
BASOPHILS NFR BLD AUTO: 0.4 %
BILIRUB SERPL-MCNC: 0.4 MG/DL (ref 0.2–1.3)
BILIRUB UR QL STRIP: NEGATIVE
BUN SERPL-MCNC: 6 MG/DL (ref 7–30)
CALCIUM SERPL-MCNC: 8.5 MG/DL (ref 8.5–10.1)
CHLORIDE SERPL-SCNC: 106 MMOL/L (ref 94–109)
CO2 SERPL-SCNC: 29 MMOL/L (ref 20–32)
COLOR UR AUTO: YELLOW
CREAT SERPL-MCNC: 0.81 MG/DL (ref 0.66–1.25)
CRP SERPL-MCNC: 8.3 MG/L (ref 0–8)
D DIMER PPP FEU-MCNC: 0.3 UG/ML FEU (ref 0–0.5)
DIFFERENTIAL METHOD BLD: ABNORMAL
EOSINOPHIL # BLD AUTO: 0.3 10E9/L (ref 0–0.7)
EOSINOPHIL NFR BLD AUTO: 2.5 %
ERYTHROCYTE [DISTWIDTH] IN BLOOD BY AUTOMATED COUNT: 12.6 % (ref 10–15)
ERYTHROCYTE [SEDIMENTATION RATE] IN BLOOD BY WESTERGREN METHOD: 5 MM/H (ref 0–15)
GFR SERPL CREATININE-BSD FRML MDRD: >90 ML/MIN/{1.73_M2}
GLUCOSE SERPL-MCNC: 82 MG/DL (ref 70–99)
GLUCOSE UR STRIP-MCNC: NEGATIVE MG/DL
HCT VFR BLD AUTO: 44.2 % (ref 40–53)
HGB BLD-MCNC: 14.8 G/DL (ref 13.3–17.7)
HGB UR QL STRIP: NEGATIVE
IMM GRANULOCYTES # BLD: 0.1 10E9/L (ref 0–0.4)
IMM GRANULOCYTES NFR BLD: 0.5 %
KETONES UR STRIP-MCNC: NEGATIVE MG/DL
LABORATORY COMMENT REPORT: NORMAL
LEUKOCYTE ESTERASE UR QL STRIP: NEGATIVE
LIPASE SERPL-CCNC: 83 U/L (ref 73–393)
LYMPHOCYTES # BLD AUTO: 2.5 10E9/L (ref 0.8–5.3)
LYMPHOCYTES NFR BLD AUTO: 21.1 %
MCH RBC QN AUTO: 30.5 PG (ref 26.5–33)
MCHC RBC AUTO-ENTMCNC: 33.5 G/DL (ref 31.5–36.5)
MCV RBC AUTO: 91 FL (ref 78–100)
MONOCYTES # BLD AUTO: 1 10E9/L (ref 0–1.3)
MONOCYTES NFR BLD AUTO: 8.6 %
NEUTROPHILS # BLD AUTO: 7.8 10E9/L (ref 1.6–8.3)
NEUTROPHILS NFR BLD AUTO: 66.9 %
NITRATE UR QL: NEGATIVE
NRBC # BLD AUTO: 0 10*3/UL
NRBC BLD AUTO-RTO: 0 /100
PH UR STRIP: 7 PH (ref 5–7)
PLATELET # BLD AUTO: 279 10E9/L (ref 150–450)
POTASSIUM SERPL-SCNC: 3.3 MMOL/L (ref 3.4–5.3)
PROT SERPL-MCNC: 7.4 G/DL (ref 6.8–8.8)
RBC # BLD AUTO: 4.85 10E12/L (ref 4.4–5.9)
RBC #/AREA URNS AUTO: 0 /HPF (ref 0–2)
SARS-COV-2 RNA RESP QL NAA+PROBE: NEGATIVE
SODIUM SERPL-SCNC: 139 MMOL/L (ref 133–144)
SOURCE: ABNORMAL
SP GR UR STRIP: 1.01 (ref 1–1.03)
SPECIMEN SOURCE: NORMAL
SQUAMOUS #/AREA URNS AUTO: <1 /HPF (ref 0–1)
UROBILINOGEN UR STRIP-MCNC: NORMAL MG/DL (ref 0–2)
WBC # BLD AUTO: 11.7 10E9/L (ref 4–11)
WBC #/AREA URNS AUTO: <1 /HPF (ref 0–5)

## 2021-05-17 PROCEDURE — 85025 COMPLETE CBC W/AUTO DIFF WBC: CPT | Performed by: PHYSICIAN ASSISTANT

## 2021-05-17 PROCEDURE — 99417 PROLNG OP E/M EACH 15 MIN: CPT | Performed by: PHYSICIAN ASSISTANT

## 2021-05-17 PROCEDURE — 86140 C-REACTIVE PROTEIN: CPT | Performed by: PHYSICIAN ASSISTANT

## 2021-05-17 PROCEDURE — 82150 ASSAY OF AMYLASE: CPT | Performed by: PHYSICIAN ASSISTANT

## 2021-05-17 PROCEDURE — 85652 RBC SED RATE AUTOMATED: CPT | Performed by: PHYSICIAN ASSISTANT

## 2021-05-17 PROCEDURE — 85379 FIBRIN DEGRADATION QUANT: CPT | Performed by: PHYSICIAN ASSISTANT

## 2021-05-17 PROCEDURE — 81001 URINALYSIS AUTO W/SCOPE: CPT | Performed by: PHYSICIAN ASSISTANT

## 2021-05-17 PROCEDURE — 36415 COLL VENOUS BLD VENIPUNCTURE: CPT | Performed by: PHYSICIAN ASSISTANT

## 2021-05-17 PROCEDURE — 83690 ASSAY OF LIPASE: CPT | Performed by: PHYSICIAN ASSISTANT

## 2021-05-17 PROCEDURE — 80053 COMPREHEN METABOLIC PANEL: CPT | Performed by: PHYSICIAN ASSISTANT

## 2021-05-17 PROCEDURE — 71046 X-RAY EXAM CHEST 2 VIEWS: CPT | Performed by: RADIOLOGY

## 2021-05-17 PROCEDURE — 87635 SARS-COV-2 COVID-19 AMP PRB: CPT | Performed by: PHYSICIAN ASSISTANT

## 2021-05-17 PROCEDURE — 99215 OFFICE O/P EST HI 40 MIN: CPT | Performed by: PHYSICIAN ASSISTANT

## 2021-05-17 PROCEDURE — 99207 REFERRAL TO ACUTE AND DIAGNOSTIC SERVICES: CPT | Performed by: PHYSICIAN ASSISTANT

## 2021-05-17 RX ORDER — METHYLPREDNISOLONE 4 MG
TABLET, DOSE PACK ORAL
Qty: 21 TABLET | Refills: 0 | Status: SHIPPED | OUTPATIENT
Start: 2021-05-17

## 2021-05-17 RX ORDER — AZITHROMYCIN 250 MG/1
TABLET, FILM COATED ORAL
Qty: 6 TABLET | Refills: 0 | Status: SHIPPED | OUTPATIENT
Start: 2021-05-17 | End: 2021-05-22

## 2021-05-17 ASSESSMENT — MIFFLIN-ST. JEOR: SCORE: 1416.25

## 2021-05-17 NOTE — RESULT ENCOUNTER NOTE
Isaak  Here are your recent results.  They are normal.  If you have any questions please do not hesitate to contact our office via phone ([phone]) or MyChart.    Daniela Song MS, PA-C  [facility]

## 2021-05-17 NOTE — PATIENT INSTRUCTIONS
Patient Education     Treating Diarrhea  Diarrhea happens when you have loose, watery, or frequent bowel movements. It is a common problem with many causes. Most cases of diarrhea clear up on their own. But certain cases may need treatment. Be sure to see your healthcare provider if your symptoms don't get better in a few days.   Getting relief  Treatment of diarrhea depends on its cause. Diarrhea caused by bacterial or parasite infection is often treated with antibiotics. Diarrhea caused by other factors, such as a stomach virus, often improves with simple home treatment. The tips below may also help ease your symptoms.       Drink plenty of fluids. This helps prevent too much fluid loss (dehydration). Water, clear soups, and electrolyte solutions are good choices. Don't take alcohol, coffee, tea, or milk. These can irritate your intestines and make symptoms worse.    Suck on ice chips if drinking makes you queasy.    Return to your normal diet slowly. You may want to eat bland foods at first, such as rice and toast. Also, you may need to stay away from certain foods for a while, such as dairy products. These can make symptoms worse. Ask your healthcare provider if there are any other foods you should stay away from.    If you were prescribed antibiotics, take them as directed.    Don't take anti-diarrhea medicines without asking your provider first.  Call your healthcare provider   Call your healthcare provider if you have any of the following:      A fever of 100.4  F ( 38.0 C) or higher, or as directed by your provider    Chills    Severe pain    Worsening diarrhea or diarrhea for more than 2 days    Bloody vomit or stool    Signs of dehydration (dizziness, dry mouth and tongue, rapid pulse, dark urine)  [employer] last reviewed this educational content on 6/1/2019 2000-2021 [employer]. All rights reserved. This information is not intended as a substitute for professional medical care. Always  follow your healthcare professional's instructions.      High-potassium content foods  Highest content (>25 mEq/100 g)   Dried figs   Molasses   Seaweed   Very high content (>12.5 mEq/100 g)   Dried fruits (dates, prunes)   Nuts   Avocados   Bran cereals   Wheat germ   Lima beans    High content (>6.2 mEq/100 g)   Vegetables   Spinach   Tomatoes   Broccoli   Winter squash   Beets   Carrots   Cauliflower   Potatoes   Fruits   Bananas   Cantaloupe   Kiwis   Oranges   Mangos   Meats   Ground beef   Steak   Pork   Veal   Garsia

## 2021-05-17 NOTE — TELEPHONE ENCOUNTER
Pt evaluated and was sent to [facility] for further evaluation.  Electronically Signed By: Brielle Hurtado PA-C

## 2021-05-17 NOTE — PROGRESS NOTES
"Isaak is a 30 year old who is being evaluated via a billable video visit.      How would you like to obtain your AVS? MyChart  If the video visit is dropped, the invitation should be resent by: Text to cell phone: [phone] patient will be using doximetry  Will anyone else be joining your video visit? No    Video Start Time: 1:42p    Assessment & Plan       ICD-10-CM    1. Chest pain, unspecified type  R07.9 Referral to Acute and Diagnostic Services (Day of diagnostic / First order acute)   2. SOB (shortness of breath)  R06.02    3. Vomiting and diarrhea  R11.10 Referral to Acute and Diagnostic Services (Day of diagnostic / First order acute)    R19.7    4. Body aches  R52 Referral to Acute and Diagnostic Services (Day of diagnostic / First order acute)   29 yo male with 3 day hx of body aches, vomiting, diarrhea, CP and SOB not responding to his albuterol inhaler. Advised further evaluation in-person with vitals and exam with consideration to further labs and imaging to rule out COVID, rhabdomyolysis versus IV fluids for viral gastroenteritis. Patient in agreement with plan.      Return today (on 5/17/2021) for further evaluation at [facility].    DI Guevara [facility] [place]    Transfer to Acute and Diagnostic Services  I have contacted the staff at the [facility] at [phone] to confirm patient acceptance.  Special Needs: None    Discussed transition to Acute & Diagnostic Services Clinic, and patient agrees with next level of care.  Patient transportation will be provided by the patient.        Subjective   Isaak is a 30 year old who presents for the following health issues: SOB, fatigue, some diarrhea    HPI   Acute Illness  Acute illness concerns:   Started with vomiting and body aches at work. Estimates 10 episodes. No hematemesis. \"chunks\", dry heaving then mucous.  Sochx: lays asphalt   Next day was fatigued and had diarrhea - every 10 minutes " "and continues. Volume was more initially and less now. ? Mucous and very malodorous. No melena or hematochezia. Did have some LUQ abdominal pain \"Stabbing\" Pain yesterday for about 5 min then resolved without recurrence.   Yesterday felt decent then today felt like he did on Friday again; started to dry heave again, but no vomiting today.  Feels weak, body aches still there.   SOB, chest pressure - hx of intermittent asthma and thought it was this at first, but then used his albuterol inhaler and wasn't helpful. Rates as 7-8/10; describes as something heavy on his chest; feels like he's held his breath for a long time and then take a deep breath; feels like this every time he breathes. Intermittent cough and when he does have it feels it causes some wheezing for about 20 min then it does subside after this. Sometimes productive and sometimes not. Feels has to focus more on his breathing, but not actively struggling to breathe.    Is able to keep down fluids and food, but wants to dry heave and has to run to have diarrhea right away.     Onset/Duration: 3 days  Symptoms:  Fever: no  Chills/Sweats: no  Headache (location?): YES  Sinus Pressure: no  Conjunctivitis:  no  Ear Pain: no  Rhinorrhea: no  Congestion: YES  Sore Throat: no  Cough: no  Wheeze: no  Decreased Appetite: YES  Nausea: no  Vomiting: YES, has subsided  Diarrhea: YES, still present  Dysuria/Freq.: no  Dysuria or Hematuria: no  Fatigue/Achiness: YES  Sick/Strep Exposure: YES, co-worker has been out all week and stood close to him on a few days. Not sure what he had.  No known covid19 exposure, but was in the hospital 2 weeks ago when his daughter was born. Was there for 3 days.     Therapies tried and outcome: tried a nasal spray, but has stopped      Review of Systems   Constitutional, HEENT, cardiovascular, pulmonary, gi and gu systems are negative, except as otherwise noted.      Objective           Vitals:  No vitals were obtained today due to " virtual visit.    Physical Exam   GENERAL: Healthy, alert and no distress  EYES: Eyes grossly normal to inspection.  No discharge or erythema, or obvious scleral/conjunctival abnormalities.  RESP: No audible wheeze, cough, or visible cyanosis.  No visible retractions or increased work of breathing.    SKIN: Visible skin clear. No significant rash, abnormal pigmentation or lesions.  NEURO: Cranial nerves grossly intact.  Mentation and speech appropriate for age.  PSYCH: Mentation appears normal, affect normal/bright, judgement and insight intact, normal speech and appearance well-groomed.                Video-Visit Details    Type of service:  Video Visit    Video End Time: 2:08p    Originating Location (pt. Location): Home    Distant Location (provider location):  [facility] [place]     Platform used for Video Visit: [place]

## 2021-05-17 NOTE — PROGRESS NOTES
Assessment & Plan     Acute bronchitis with coexisting condition requiring prophylactic treatment  Mild intermittent asthma with exacerbation  Chest pain, unspecified type  Tobacco abuse disorder  Exposure to COVID-19 virus  Body aches  Stat labs and chest x-ray reassuring.  Covid swab negative.  Suspect asthma exacerbation with bronchitis.  Albuterol not providing significant relief.  Will have patient complete a Medrol Dosepak and azithromycin to cover for bacterial etiology.  Tobacco cessation strongly encouraged.  Strongly recommended patient obtain COVID-19 vaccine especially with  at home.  Patient voiced understanding and agreement.  - azithromycin (ZITHROMAX) 250 MG tablet  Dispense: 6 tablet; Refill: 0  - methylPREDNISolone (MEDROL DOSEPAK) 4 MG tablet therapy pack  Dispense: 21 tablet; Refill: 0  - Referral to Acute and Diagnostic Services (Day of diagnostic / First order acute)  - Symptomatic SARS-CoV-2 COVID-19 Virus (Coronavirus) by PCR  - CBC with platelets differential  - Comprehensive metabolic panel (BMP + Alb, Alk Phos, ALT, AST, Total. Bili, TP)  - ESR: Erythrocyte sedimentation rate  - CRP, inflammation  - D dimer, quantitative  - XR Chest 2 Views    Hypokalemia  Vomiting and diarrhea  Suspect viral gastroenteritis.  [place] diet recommended and hydration efforts with electrolytes.  Potassium rich foods also encouraged.  Recheck of potassium in 1 week with PCP.  - Referral to Acute and Diagnostic Services (Day of diagnostic / First order acute)  - Symptomatic SARS-CoV-2 COVID-19 Virus (Coronavirus) by PCR  - CBC with platelets differential  - Comprehensive metabolic panel (BMP + Alb, Alk Phos, ALT, AST, Total. Bili, TP)  - ESR: Erythrocyte sedimentation rate  - CRP, inflammation  - D dimer, quantitative  - XR Chest 2 Views  - Lipase  - Amylase    85 minutes spent on the date of the encounter doing chart review, history and exam, documentation and further activities per the note    Return  "in about 1 week (around 2021) for evaluation with Dr. Bonner and repeat labs.    [facility] is a 30 year old who presents for the following health issues  accompanied by his mother:    HPI     Acute Illness  Acute illness concerns: Difficulty breathing, Vomiting, Diarrhea, Fatigue  Onset/Duration: X 4 days  Symptoms:  Fever: has not checked  Chills/Sweats: YES- chills  Headache (location?): YES- \"surrounding, top of head, like a band\"  Sinus Pressure: YES  Conjunctivitis:  no  Ear Pain: no  Rhinorrhea: YES  Congestion: YES  Sore Throat: YES  Cough: YES-non-productive, productive of yellow sputum  Wheeze: YES- when coughing  Decreased Appetite: no  Nausea: YES  Vomiting: YES  Diarrhea: YES  Dysuria/Freq.: YES- dysuria-5/15/21 and 21.  But notes he has not hydrated much since symptoms started  Dysuria or Hematuria: no  Fatigue/Achiness: YES  Sick/Strep Exposure: YES- coworker has been out of work all week, not sure if he is COVID positive or not.  Therapies tried and outcome: Benadryl-took this to see if symptoms were seasonal allergy related, this did not help, Mucinex-this was due to congestion, this loosened the mucus.  Does have a history of asthma.  Albuterol has not been helpful.  Generally smokes 1/2 pack a day and has been unable to finish a cigarette due to shortness of breath/chest pain.  He states that out of all of his symptoms the chest pressure/pain is the worst.    He had a  baby approximately 2 weeks ago and was in the hospital for that.  He is not vaccinated against COVID-19.      Review of Systems   Constitutional, HEENT, cardiovascular, pulmonary, GI, , musculoskeletal, neuro, skin, endocrine and psych systems are negative, except as otherwise noted.      Objective    /62 (BP Location: Right arm)   Pulse 93   Temp 97.9  F (36.6  C) (Tympanic)   Ht 1.575 m (5' 2\")   Wt 56.7 kg (125 lb)   SpO2 98%   " BMI 22.86 kg/m    Body mass index is 22.86 kg/m .  Physical Exam   GENERAL: healthy, alert and no distress  EYES: Eyes grossly normal to inspection, PERRL and conjunctivae and sclerae normal  HENT: ear canals normal, left TM erythematous without effusion, right TM normal, nose and mouth without ulcers or lesions, poor dentition  NECK: no adenopathy, no asymmetry, masses, or scars and thyroid normal to palpation  RESP: lungs clear to auscultation - no rales, rhonchi or wheezes  CV: regular rate and rhythm, normal S1 S2, no S3 or S4, no murmur, click or rub, no peripheral edema and peripheral pulses strong  ABDOMEN: soft, nontender, no hepatosplenomegaly, no masses and bowel sounds normal  MS: no gross musculoskeletal defects noted, no edema  SKIN: no suspicious lesions or rashes  NEURO: Normal strength and tone, mentation intact and speech normal  PSYCH: mentation appears normal, affect normal/bright    Results for orders placed or performed in visit on 05/17/21 (from the past 24 hour(s))   Symptomatic SARS-CoV-2 COVID-19 Virus (Coronavirus) by PCR    Specimen: Nasopharyngeal   Result Value Ref Range    SARS-CoV-2 Virus Specimen Source Nasopharyngeal     SARS-CoV-2 PCR Result NEGATIVE     SARS-CoV-2 PCR Comment (Note)    CBC with platelets differential   Result Value Ref Range    WBC 11.7 (H) 4.0 - 11.0 10e9/L    RBC Count 4.85 4.4 - 5.9 10e12/L    Hemoglobin 14.8 13.3 - 17.7 g/dL    Hematocrit 44.2 40.0 - 53.0 %    MCV 91 78 - 100 fl    MCH 30.5 26.5 - 33.0 pg    MCHC 33.5 31.5 - 36.5 g/dL    RDW 12.6 10.0 - 15.0 %    Platelet Count 279 150 - 450 10e9/L    Diff Method Automated Method     % Neutrophils 66.9 %    % Lymphocytes 21.1 %    % Monocytes 8.6 %    % Eosinophils 2.5 %    % Basophils 0.4 %    % Immature Granulocytes 0.5 %    Nucleated RBCs 0 0 /100    Absolute Neutrophil 7.8 1.6 - 8.3 10e9/L    Absolute Lymphocytes 2.5 0.8 - 5.3 10e9/L    Absolute Monocytes 1.0 0.0 - 1.3 10e9/L    Absolute Eosinophils 0.3 0.0  - 0.7 10e9/L    Absolute Basophils 0.1 0.0 - 0.2 10e9/L    Abs Immature Granulocytes 0.1 0 - 0.4 10e9/L    Absolute Nucleated RBC 0.0    Comprehensive metabolic panel (BMP + Alb, Alk Phos, ALT, AST, Total. Bili, TP)   Result Value Ref Range    Sodium 139 133 - 144 mmol/L    Potassium 3.3 (L) 3.4 - 5.3 mmol/L    Chloride 106 94 - 109 mmol/L    Carbon Dioxide 29 20 - 32 mmol/L    Anion Gap 4 3 - 14 mmol/L    Glucose 82 70 - 99 mg/dL    Urea Nitrogen 6 (L) 7 - 30 mg/dL    Creatinine 0.81 0.66 - 1.25 mg/dL    GFR Estimate >90 >60 mL/min/[1.73_m2]    GFR Estimate If Black >90 >60 mL/min/[1.73_m2]    Calcium 8.5 8.5 - 10.1 mg/dL    Bilirubin Total 0.4 0.2 - 1.3 mg/dL    Albumin 3.6 3.4 - 5.0 g/dL    Protein Total 7.4 6.8 - 8.8 g/dL    Alkaline Phosphatase 90 40 - 150 U/L    ALT 45 0 - 70 U/L    AST 33 0 - 45 U/L   ESR: Erythrocyte sedimentation rate   Result Value Ref Range    Sed Rate 5 0 - 15 mm/h   CRP, inflammation   Result Value Ref Range    CRP Inflammation 8.3 (H) 0.0 - 8.0 mg/L   D dimer, quantitative   Result Value Ref Range    D Dimer 0.3 0.0 - 0.50 ug/ml FEU   Lipase   Result Value Ref Range    Lipase 83 73 - 393 U/L   Amylase   Result Value Ref Range    Amylase 52 30 - 110 U/L       Recent Results (from the past 744 hour(s))   XR Chest 2 Views    Narrative    CHEST TWO VIEWS 5/17/2021 4:36 PM     HISTORY: Suspected COVID, chest pressure, shortness of breath, history  of asthma and tobacco abuse.  Chest pain, unspecified type. Vomiting  and diarrhea. Body aches.    COMPARISON: March 30, 2019       Impression    IMPRESSION: There are no acute infiltrates. The cardiac silhouette is  not enlarged. Pulmonary vasculature is unremarkable.

## 2021-05-17 NOTE — TELEPHONE ENCOUNTER
"Pt is calling in about Covid 19 symptoms that started Friday. Pt reports he has had a dry cough, sore throat, body aches, fever, and chills, and feels very fatigued. Pt also has diarrhea and vomiting also. Pt also has difficulty breathing when he tries to take a deep breath. Pt reports chest tightness also, and has a history of Asthma, and says his inhaler is doing nothing. Pt rates the chest tightness \"7-8\". Pt rates sore throat \"6-7\".   Care advice given, use of humidified air, increasing fluids,warm chicken broth or Apple juice, and Tylenol for fever, and body aches. Isolation and risk of dehydration was discussed. Pt will try to isolate, but just had a new baby. Pt will try to have others help with the baby.   Per protocol pt should be evaluated in the ER. Pt was able to schedule a video visit for 130 pm today.   Pt agrees with plan, but will wait for appointment with the clinic for Second Level Triage. Pt was advised to call back if symptoms worsen. Pt agrees with plan, and will wait to speak with the doctor at 130 pm.      Luc Esposito RN on 5/17/2021 at 12:53 PM         COVID 19 Nurse Triage Plan/Patient Instructions    Please be aware that novel coronavirus (COVID-19) may be circulating in the community. If you develop symptoms such as fever, cough, or SOB or if you have concerns about the presence of another infection including coronavirus (COVID-19), please contact your health care provider or visit [email or web address].     Disposition/Instructions    Virtual Visit with provider recommended, or ER if physician agrees. Reference Visit Selection Guide.    Thank you for taking steps to prevent the spread of this virus.  o Limit your contact with others.  o Wear a simple mask to cover your cough.  o Wash your hands well and often.    Resources    [facility]: About COVID-19: [email or web address]    CDC: What to Do If You're Sick: " www.cdc.gov/coronavirus/2019-ncov/about/steps-when-sick.html    CDC: Ending Home Isolation: www.cdc.gov/coronavirus/2019-ncov/hcp/disposition-in-home-patients.html     CDC: Caring for Someone: www.cdc.gov/coronavirus/2019-ncov/if-you-are-sick/care-for-someone.html     [facility]: Interim Guidance for Hospital Discharge to Home: www.[email or web address].[email or web address]./diseases/coronavirus/hcp/hospdischarge.pdf    [facility] clinical trials (COVID-19 research studies): [email or web address]     Below are the COVID-19 hotlines at the [place] Department of Health ([facility]). Interpreters are available.   o For health questions: Call 253-606-6180 or 1-837.308.9264 (7 a.m. to 7 p.m.)  o For questions about schools and childcare: Call 378-034-6652 or 1-799.751.7194 (7 a.m. to 7 p.m.)       Reason for Disposition    MILD difficulty breathing (e.g., minimal/no SOB at rest, SOB with walking, pulse <100)    Chest pain or pressure    Additional Information    Negative: SEVERE difficulty breathing (e.g., struggling for each breath, speaks in single words)    Negative: Difficult to awaken or acting confused (e.g., disoriented, slurred speech)    Negative: Bluish (or gray) lips or face now    Negative: Shock suspected (e.g., cold/pale/clammy skin, too weak to stand, low BP, rapid pulse)    Negative: Sounds like a life-threatening emergency to the triager    Negative: [1] COVID-19 exposure AND [2] has not completed COVID-19 vaccine series AND [3] no symptoms    Negative: [1] COVID-19 exposure AND [2] completed COVID-19 vaccine series (fully vaccinated) AND [3] no symptoms    Negative: COVID-19 vaccine reaction suspected (e.g., fever, headache, muscle aches) occurring during days 1-3 after getting vaccine    Negative: COVID-19 vaccine, questions about    Negative: [1] COVID-19 vaccine series completed (fully vaccinated) in past 3 months AND [2] new-onset of COVID-19 symptoms BUT [3] no known exposure    Negative: [1] Had lab  test confirmed COVID-19 infection within last 3 monthsAND [2] new-onset of COVID-19 symptoms BUT [3] no known exposure    Negative: [1] Lives with someone known to have influenza (flu test positive) AND [2] flu-like symptoms (e.g., cough, runny nose, sore throat, SOB; with or without fever)    Negative: [1] Adult with possible COVID-19 symptoms AND [2] triager concerned about severity of symptoms or other causes    Negative: COVID-19 and breastfeeding, questions about    Negative: SEVERE or constant chest pain or pressure (Exception: mild central chest pain, present only when coughing)    Negative: MODERATE difficulty breathing (e.g., speaks in phrases, SOB even at rest, pulse 100-120)    Negative: [1] Headache AND [2] stiff neck (can't touch chin to chest)    Protocols used: CORONAVIRUS (COVID-19) DIAGNOSED OR [id number] 3.25

## 2021-05-18 NOTE — RESULT ENCOUNTER NOTE
Results discussed directly with patient while patient was present. Any further details documented in the note.   Daniela Song PA-C

## 2021-06-27 ENCOUNTER — NURSE TRIAGE (OUTPATIENT)
Dept: NURSING | Facility: CLINIC | Age: 31
End: 2021-06-27
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
## 2021-08-01 NOTE — LETTER
[address] [address]  [phone]            April 26, 2021    Travis Fraga                                                                                                                                                       [address] [facility] [address]              Dear Travis,    We have a refill request for you, but in order to obtain your   medication you need to be seen for a physical .   Please either schedule it through [facility] or call [phone].     Thank you,  Sincerely,  [facility]  
unable to determine

## 2021-09-19 ENCOUNTER — HEALTH MAINTENANCE LETTER (OUTPATIENT)
Age: 31
End: 2021-09-19

## 2021-10-27 NOTE — LETTER
[facility]  [address] [address] [facility]  [address]  [facility] [id number] [phone]       May 17, 2021    Travis Fraga  [facility] [facility] [address]    To Whom it May Concern:    The above patient is unable to attend work 5/17/2021 -5/19/2021 due to a medical issue. Please contact me with questions or concerns.      Sincerely,    Daniela Song PA-C    contact guard

## 2022-04-30 ENCOUNTER — HEALTH MAINTENANCE LETTER (OUTPATIENT)
Age: 32
End: 2022-04-30

## 2022-05-05 ENCOUNTER — TELEPHONE (OUTPATIENT)
Dept: FAMILY MEDICINE | Facility: CLINIC | Age: 32
End: 2022-05-05

## 2022-05-05 ENCOUNTER — MYC MEDICAL ADVICE (OUTPATIENT)
Dept: FAMILY MEDICINE | Facility: CLINIC | Age: 32
End: 2022-05-05

## 2022-05-05 NOTE — TELEPHONE ENCOUNTER
Needs of attention regarding:  -Wellness (Physical) Visit     Health Maintenance Topics with due status: Overdue       Topic Date Due    ADVANCE CARE PLANNING Never done    COVID-19 Vaccine Never done    Pneumococcal Vaccine: Pediatrics (0 to 5 Years) and At-Risk Patients (6 to 64 Years) Never done    PREVENTIVE CARE VISIT 07/18/2012    ASTHMA ACTION PLAN 12/04/2019    ASTHMA CONTROL TEST 10/13/2021    PHQ-2 (once per calendar year) 01/01/2022    ANNUAL REVIEW OF HM ORDERS 04/13/2022       Communication:  See MyChart message

## 2022-05-05 NOTE — LETTER
[facility]  [address] [place] [id number] [phone]                    June 2, 2022    Trvais BRIGGS [place]  [address] [facility] [address]    Dear Isaak,    I hope you are doing well.       This is a reminder that you are due for a Wellness Visit (annual full physical).   So that you get your desired appointment time, please call [phone] to schedule this or you can use [employer] if you have an account. If you have had this visit completed elsewhere, or you believe you received this letter in error, please contact us at [phone].    In addition, here is a list of due or overdue Health Maintenance reminders.    Health Maintenance Due   Topic Date Due     Discuss Advance Care Planning  Never done     COVID-19 Vaccine (1) Never done     Pneumococcal Vaccine (1 - PCV) Never done     Preventive Care Visit  07/18/2012     Asthma Action Plan - yearly  12/04/2019     Asthma Control Test  10/13/2021     PHQ-2 (once per calendar year)  01/01/2022     ANNUAL REVIEW OF HM ORDERS  04/13/2022       Please call us at [phone] (or use [employer]) to address the above recommendations or we can discuss these reminders further at your appointment.      Best Regards,          Isrrael Bonner M.D.

## 2022-06-02 NOTE — TELEPHONE ENCOUNTER
Needs of attention regarding:  -Wellness (Physical) Visit     Health Maintenance Topics with due status: Overdue       Topic Date Due    ADVANCE CARE PLANNING Never done    COVID-19 Vaccine Never done    Pneumococcal Vaccine: Pediatrics (0 to 5 Years) and At-Risk Patients (6 to 64 Years) Never done    PREVENTIVE CARE VISIT 07/18/2012    ASTHMA ACTION PLAN 12/04/2019    ASTHMA CONTROL TEST 10/13/2021    PHQ-2 (once per calendar year) 01/01/2022    ANNUAL REVIEW OF HM ORDERS 04/13/2022       Communication:  See Letter

## 2022-11-20 ENCOUNTER — HEALTH MAINTENANCE LETTER (OUTPATIENT)
Age: 32
End: 2022-11-20

## 2023-06-02 ENCOUNTER — HEALTH MAINTENANCE LETTER (OUTPATIENT)
Age: 33
End: 2023-06-02

## 2024-06-22 ENCOUNTER — HEALTH MAINTENANCE LETTER (OUTPATIENT)
Age: 34
End: 2024-06-22